# Patient Record
Sex: FEMALE | Race: WHITE | NOT HISPANIC OR LATINO | Employment: PART TIME | URBAN - METROPOLITAN AREA
[De-identification: names, ages, dates, MRNs, and addresses within clinical notes are randomized per-mention and may not be internally consistent; named-entity substitution may affect disease eponyms.]

---

## 2017-05-03 ENCOUNTER — PATIENT MESSAGE (OUTPATIENT)
Dept: GASTROENTEROLOGY | Facility: CLINIC | Age: 57
End: 2017-05-03

## 2017-05-03 ENCOUNTER — DOCUMENTATION ONLY (OUTPATIENT)
Dept: ENDOSCOPY | Facility: HOSPITAL | Age: 57
End: 2017-05-03

## 2017-05-03 DIAGNOSIS — K21.9 GASTROESOPHAGEAL REFLUX DISEASE, ESOPHAGITIS PRESENCE NOT SPECIFIED: Primary | ICD-10-CM

## 2017-05-03 NOTE — PROGRESS NOTES
From: Misty Armendariz     Sent: 5/3/2017 12:00 PM CDT       To: Solomon Whitaker MD  Subject: Non-Urgent Medical    Hi Dr. Whitaker,    I have been off my ppi for awhile and but feeling some heartburn and hoarseness.  I would like to schedule an upper GI with the ph probe to see if I have esophagus damage or can make a new plan to not include ppi's.  Please let me know what I need to do to schedule this procedure.  Thank you.

## 2017-05-30 ENCOUNTER — TELEPHONE (OUTPATIENT)
Dept: ENDOSCOPY | Facility: HOSPITAL | Age: 57
End: 2017-05-30

## 2017-05-30 NOTE — TELEPHONE ENCOUNTER
As per Mango marquez for Pt to remain on schedule for 6/14/17 for EGD/ROSE with Dr. Whitaker at 1400.

## 2017-06-14 ENCOUNTER — ANESTHESIA (OUTPATIENT)
Dept: ENDOSCOPY | Facility: HOSPITAL | Age: 57
End: 2017-06-14
Payer: COMMERCIAL

## 2017-06-14 ENCOUNTER — ANESTHESIA EVENT (OUTPATIENT)
Dept: ENDOSCOPY | Facility: HOSPITAL | Age: 57
End: 2017-06-14
Payer: COMMERCIAL

## 2017-06-14 ENCOUNTER — SURGERY (OUTPATIENT)
Age: 57
End: 2017-06-14

## 2017-06-14 ENCOUNTER — TELEPHONE (OUTPATIENT)
Dept: ENDOSCOPY | Facility: HOSPITAL | Age: 57
End: 2017-06-14

## 2017-06-14 ENCOUNTER — HOSPITAL ENCOUNTER (OUTPATIENT)
Facility: HOSPITAL | Age: 57
Discharge: HOME | End: 2017-06-14
Attending: INTERNAL MEDICINE | Admitting: INTERNAL MEDICINE
Payer: COMMERCIAL

## 2017-06-14 VITALS
RESPIRATION RATE: 14 BRPM | TEMPERATURE: 98 F | BODY MASS INDEX: 21 KG/M2 | WEIGHT: 123 LBS | SYSTOLIC BLOOD PRESSURE: 130 MMHG | OXYGEN SATURATION: 100 % | DIASTOLIC BLOOD PRESSURE: 69 MMHG | HEIGHT: 64 IN | HEART RATE: 71 BPM

## 2017-06-14 VITALS — RESPIRATION RATE: 21 BRPM

## 2017-06-14 DIAGNOSIS — K21.9 GASTROESOPHAGEAL REFLUX DISEASE WITHOUT ESOPHAGITIS: Primary | ICD-10-CM

## 2017-06-14 DIAGNOSIS — K22.10 EROSIVE ESOPHAGITIS: Primary | ICD-10-CM

## 2017-06-14 DIAGNOSIS — Z79.899 ENCOUNTER FOR LONG-TERM CURRENT USE OF MEDICATION: ICD-10-CM

## 2017-06-14 DIAGNOSIS — Z79.899 USE OF PROTON PUMP INHIBITOR THERAPY: ICD-10-CM

## 2017-06-14 DIAGNOSIS — R10.11 RUQ ABDOMINAL PAIN: ICD-10-CM

## 2017-06-14 DIAGNOSIS — K21.9 GERD (GASTROESOPHAGEAL REFLUX DISEASE): ICD-10-CM

## 2017-06-14 PROCEDURE — 25000003 PHARM REV CODE 250: Performed by: INTERNAL MEDICINE

## 2017-06-14 PROCEDURE — 43239 EGD BIOPSY SINGLE/MULTIPLE: CPT | Performed by: INTERNAL MEDICINE

## 2017-06-14 PROCEDURE — 37000008 HC ANESTHESIA 1ST 15 MINUTES: Performed by: INTERNAL MEDICINE

## 2017-06-14 PROCEDURE — 27201012 HC FORCEPS, HOT/COLD, DISP: Performed by: INTERNAL MEDICINE

## 2017-06-14 PROCEDURE — 63600175 PHARM REV CODE 636 W HCPCS: Performed by: NURSE ANESTHETIST, CERTIFIED REGISTERED

## 2017-06-14 PROCEDURE — 88305 TISSUE EXAM BY PATHOLOGIST: CPT | Mod: 26,,,

## 2017-06-14 PROCEDURE — D9220A PRA ANESTHESIA: Mod: ANES,,, | Performed by: ANESTHESIOLOGY

## 2017-06-14 PROCEDURE — 37000009 HC ANESTHESIA EA ADD 15 MINS: Performed by: INTERNAL MEDICINE

## 2017-06-14 PROCEDURE — 25000003 PHARM REV CODE 250: Performed by: NURSE ANESTHETIST, CERTIFIED REGISTERED

## 2017-06-14 PROCEDURE — 88305 TISSUE EXAM BY PATHOLOGIST: CPT

## 2017-06-14 PROCEDURE — 88342 IMHCHEM/IMCYTCHM 1ST ANTB: CPT | Mod: 26,,,

## 2017-06-14 PROCEDURE — 91035 G-ESOPH REFLX TST W/ELECTROD: CPT | Mod: 26,,, | Performed by: INTERNAL MEDICINE

## 2017-06-14 PROCEDURE — 91035 G-ESOPH REFLX TST W/ELECTROD: CPT | Performed by: INTERNAL MEDICINE

## 2017-06-14 PROCEDURE — 27200942: Performed by: INTERNAL MEDICINE

## 2017-06-14 PROCEDURE — 43239 EGD BIOPSY SINGLE/MULTIPLE: CPT | Mod: ,,, | Performed by: INTERNAL MEDICINE

## 2017-06-14 PROCEDURE — D9220A PRA ANESTHESIA: Mod: CRNA,,, | Performed by: NURSE ANESTHETIST, CERTIFIED REGISTERED

## 2017-06-14 RX ORDER — LIDOCAINE HCL/PF 100 MG/5ML
SYRINGE (ML) INTRAVENOUS
Status: DISCONTINUED | OUTPATIENT
Start: 2017-06-14 | End: 2017-06-14

## 2017-06-14 RX ORDER — GLYCOPYRROLATE 0.2 MG/ML
INJECTION INTRAMUSCULAR; INTRAVENOUS
Status: DISCONTINUED | OUTPATIENT
Start: 2017-06-14 | End: 2017-06-14

## 2017-06-14 RX ORDER — PROPOFOL 10 MG/ML
VIAL (ML) INTRAVENOUS
Status: DISCONTINUED | OUTPATIENT
Start: 2017-06-14 | End: 2017-06-14

## 2017-06-14 RX ORDER — SODIUM CHLORIDE 9 MG/ML
INJECTION, SOLUTION INTRAVENOUS CONTINUOUS
Status: DISCONTINUED | OUTPATIENT
Start: 2017-06-14 | End: 2017-06-14 | Stop reason: HOSPADM

## 2017-06-14 RX ADMIN — PROPOFOL 30 MG: 10 INJECTION, EMULSION INTRAVENOUS at 03:06

## 2017-06-14 RX ADMIN — SODIUM CHLORIDE: 0.9 INJECTION, SOLUTION INTRAVENOUS at 01:06

## 2017-06-14 RX ADMIN — PROPOFOL 40 MG: 10 INJECTION, EMULSION INTRAVENOUS at 03:06

## 2017-06-14 RX ADMIN — PROPOFOL 90 MG: 10 INJECTION, EMULSION INTRAVENOUS at 03:06

## 2017-06-14 RX ADMIN — LIDOCAINE HYDROCHLORIDE 100 MG: 20 INJECTION, SOLUTION INTRAVENOUS at 03:06

## 2017-06-14 RX ADMIN — GLYCOPYRROLATE 0.2 MG: 0.2 INJECTION, SOLUTION INTRAMUSCULAR; INTRAVENOUS at 03:06

## 2017-06-14 NOTE — H&P
[facility]  History & Physical    Subjective:      Chief Complaint/Reason for Admission:     EGD with Trevino    Misty Armendariz is a 57 y.o. female.    Past Medical History:   Diagnosis Date    Cancer     Cataract     surgery left eye, blepperplexy bilat eye    Skin cancer      Past Surgical History:   Procedure Laterality Date     SECTION      WISDOM TOOTH EXTRACTION       Family History   Problem Relation Age of Onset    Celiac disease Neg Hx     Cirrhosis Neg Hx     Colon cancer Neg Hx     Colon polyps Neg Hx     Crohn's disease Neg Hx     Cystic fibrosis Neg Hx     Esophageal cancer Neg Hx     Hemochromatosis Neg Hx     Inflammatory bowel disease Neg Hx     Irritable bowel syndrome Neg Hx     Liver cancer Neg Hx     Liver disease Neg Hx     Rectal cancer Neg Hx     Stomach cancer Neg Hx     Ulcerative colitis Neg Hx      Social History   Substance Use Topics    Smoking status: Never Smoker    Smokeless tobacco: Not on file    Alcohol use 4.2 oz/week     4 Glasses of wine, 3 Shots of liquor per week       PTA Medications   Medication Sig    omeprazole (PRILOSEC) 40 MG capsule Take 1 capsule (40 mg total) by mouth before breakfast.     Review of patient's allergies indicates:   Allergen Reactions    Erythromycin     Penicillins         Review of Systems   Constitutional: Negative for chills, fever and weight loss.   Respiratory: Negative for shortness of breath and wheezing.    Cardiovascular: Negative for chest pain.   Gastrointestinal: Positive for heartburn. Negative for abdominal pain, blood in stool, constipation, diarrhea and melena.       Objective:      Vital Signs (Most Recent)  Temp: 99.1 °F (37.3 °C) (17 1332)  Pulse: 64 (17 1332)  Resp: 18 (17 1332)  BP: 126/67 (17 1332)  SpO2: 98 % (17 1332)    Vital Signs Range (Last 24H):  Temp:  [99.1 °F (37.3 °C)]   Pulse:  [64]   Resp:  [18-30]   BP: (126)/(67)   SpO2:  [98 %]      Physical Exam   Constitutional: She is oriented to person, place, and time. She appears well-developed and well-nourished.   Cardiovascular: Normal rate.    Pulmonary/Chest: Effort normal.   Abdominal: Soft. Bowel sounds are normal.   Neurological: She is alert and oriented to person, place, and time.   Skin: Skin is warm and dry.   Psychiatric: She has a normal mood and affect. Her behavior is normal. Judgment and thought content normal.           Assessment:      Active Hospital Problems    Diagnosis  POA    GERD (gastroesophageal reflux disease) [K21.9]  Yes      Resolved Hospital Problems    Diagnosis Date Resolved POA   No resolved problems to display.       Plan:    EGD with esophageal Bravo pH probe placement off PPI and off H2 Blockers for 6 weeks.

## 2017-06-14 NOTE — TRANSFER OF CARE
"Anesthesia Transfer of Care Note    Patient: Misty Armendariz    Procedure(s) Performed: Procedure(s) (LRB):  ESOPHAGOGASTRODUODENOSCOPY (EGD) (N/A)  ESOPHAGEAL BRAVO PH (N/A)    Patient location: PACU    Anesthesia Type: general    Transport from OR: Transported from OR on room air with adequate spontaneous ventilation    Post pain: adequate analgesia    Post assessment: no apparent anesthetic complications and tolerated procedure well    Post vital signs: stable    Level of consciousness: sedated    Nausea/Vomiting: no nausea/vomiting    Complications: none    Transfer of care protocol was followed      Last vitals:   Visit Vitals  /67   Pulse 64   Temp 37.3 °C (99.1 °F) (Oral)   Resp 18   Ht 5' 4" (1.626 m)   Wt 55.8 kg (123 lb)   SpO2 98%   Breastfeeding? No   BMI 21.11 kg/m²     "

## 2017-06-14 NOTE — ANESTHESIA PREPROCEDURE EVALUATION
06/14/2017  Misty Armendariz is a 57 y.o., female.    Anesthesia Evaluation         Review of Systems      Physical Exam  General:  Well nourished    Airway/Jaw/Neck:  Airway Findings: Mouth Opening: Normal Tongue: Normal  General Airway Assessment: Adult  Mallampati: II  Improves to II with phonation.  TM Distance: Normal, at least 6 cm      Dental:  Dental Findings: In tact   Chest/Lungs:  Chest/Lungs Findings: Clear to auscultation     Heart/Vascular:  Heart Findings: Rate: Normal  Rhythm: Regular Rhythm  Sounds: Normal        Mental Status:  Mental Status Findings:  Cooperative, Alert and Oriented         Anesthesia Plan  Type of Anesthesia, risks & benefits discussed:  Anesthesia Type:  general  Patient's Preference: General  Intra-op Monitoring Plan: standard ASA monitors  Intra-op Monitoring Plan Comments: Standard ASA monitors.   Post Op Pain Control Plan: per primary service following discharge from PACU  Post Op Pain Control Plan Comments: Per primary service.     Induction:   IV  Beta Blocker:  Patient is not currently on a Beta-Blocker (No further documentation required).       Informed Consent: Patient understands risks and agrees with Anesthesia plan.  Questions answered. Anesthesia consent signed with patient.  ASA Score: 2     Day of Surgery Review of History & Physical:    H&P update referred to the surgeon.     Anesthesia Plan Notes: Chart reviewed, patient interviewed and examined.  The plan for general anesthesia was explained.  Questions were answered and the consent was signed.  Cesar RAO         Ready For Surgery From Anesthesia Perspective.

## 2017-06-14 NOTE — PATIENT INSTRUCTIONS
Discharge Summary/Instructions after an Endoscopic Procedure  Patient Name: Misty Armendariz  Patient MRN: [id number]  Patient YOB: 1960 Wednesday, June 14, 2017  Solomon Whitaker MD  RESTRICTIONS ON ACTIVITY:  - DO NOT drive a car, operate machinery, make legal/financial decisions, or   drink alcohol until the day after the procedure.    - The following day: return to full activity including work, except no heavy   lifting, straining or running for 3 days if polyps were removed.  - Diet: Eat and drink normally unless instructed otherwise.  TREATMENT FOR COMMON SIDE EFFECTS:  - Mild abdominal pain, bloating or excessive gas: rest, eat lightly and use   a heating pad.  - Sore Throat - treat with throat lozenges. Gargle with warm salt water.  SYMPTOMS TO WATCH FOR AND REPORT TO YOUR PHYSICIAN:  1. Severe abdominal pain or bloating.  2. Pain in chest.  3. Chills or fever occurring within 24 hours after a procedure.  4. A large amount of rectal bleeding, which would show as bright red,   maroon, or black stools. (A small amount of blood from the rectum is not   serious, especially if hemorrhoids are present.)  5. Because air was used during the procedure, expelling large amounts of air   from your rectum or belching is normal.  6. If a bowel prep was taken, you may not have a bowel movement for 1-3   days.  This is normal.  7. Go directly to the emergency room if you notice any of the following:   Chills and/or fever over 101 F   Persistent vomiting   Severe abdominal pain, other than gas cramps   Severe chest pain   Black, tarry stools   Any bleeding - exceeding one tablespoon  Your doctor recommends these additional instructions:  If any biopsies were performed, my office will call you in 5 to 6 business   days with any results.  You are being discharged to home.   We are waiting for your pathology results.   Telephone your endoscopist for pathology results in two weeks.   Telephone your endoscopist for study  results in one week.   Return to your GI clinic at the next available appointment.   Your physician has recommended a repeat upper endoscopy in 12 weeks to check   healing.  For questions, problems or results please call your physician - Solomon Whitaker MD at Work:  [phone].  [facility], EMERGENCY ROOM PHONE NUMBER: [phone]  IF A COMPLICATION OR EMERGENCY SITUATION ARISES AND YOU ARE UNABLE TO REACH   YOUR PHYSICIAN - GO TO THE EMERGENCY ROOM.  Solomon Whitaker MD  6/14/2017 3:28:38 PM  This report has been verified and signed electronically.

## 2017-06-15 ENCOUNTER — PATIENT MESSAGE (OUTPATIENT)
Dept: GASTROENTEROLOGY | Facility: CLINIC | Age: 57
End: 2017-06-15

## 2017-06-15 ENCOUNTER — TELEPHONE (OUTPATIENT)
Dept: GASTROENTEROLOGY | Facility: CLINIC | Age: 57
End: 2017-06-15

## 2017-06-15 NOTE — TELEPHONE ENCOUNTER
Patient aware to be here no later than 4:00 tomorrow.  Aware the machine will shut off at 3:18. Patient expressed understanding.

## 2017-06-15 NOTE — TELEPHONE ENCOUNTER
----- Message from Gracie Mondragon sent at 6/15/2017  3:02 PM CDT -----  Contact: self - [phone]  lizett - when does she need to return the ph monitor - please call patient at

## 2017-06-15 NOTE — ANESTHESIA POSTPROCEDURE EVALUATION
"Anesthesia Post Evaluation    Patient: Misty Armendariz    Procedure(s) Performed: Procedure(s) (LRB):  ESOPHAGOGASTRODUODENOSCOPY (EGD) (N/A)  ESOPHAGEAL BRAVO PH (N/A)    Final Anesthesia Type: general  Patient location during evaluation: PACU  Patient participation: Yes- Able to Participate  Level of consciousness: awake and alert and oriented  Post-procedure vital signs: reviewed and stable  Pain management: adequate  Airway patency: patent  PONV status at discharge: No PONV  Anesthetic complications: no      Cardiovascular status: stable  Respiratory status: unassisted  Hydration status: euvolemic  Follow-up not needed.        Visit Vitals  /69 (BP Location: Left arm, Patient Position: Sitting, BP Method: Automatic)   Pulse 71   Temp 36.6 °C (97.8 °F) (Oral)   Resp 14   Ht 5' 4" (1.626 m)   Wt 55.8 kg (123 lb)   SpO2 100%   Breastfeeding? No   BMI 21.11 kg/m²       Pain/Carrie Score: Pain Assessment Performed: Yes (6/14/2017  3:55 PM)  Presence of Pain: denies (6/14/2017  3:55 PM)  Pain Rating Prior to Med Admin: 2 (6/14/2017  1:29 PM)  Carrie Score: 10 (6/14/2017  3:55 PM)      "

## 2017-06-21 ENCOUNTER — TELEPHONE (OUTPATIENT)
Dept: ENDOSCOPY | Facility: HOSPITAL | Age: 57
End: 2017-06-21

## 2017-06-22 ENCOUNTER — TELEPHONE (OUTPATIENT)
Dept: GASTROENTEROLOGY | Facility: CLINIC | Age: 57
End: 2017-06-22

## 2017-06-22 NOTE — TELEPHONE ENCOUNTER
----- Message from Solomon Whitaker MD sent at 6/21/2017  5:49 PM CDT -----  Maria Ines - please tell Misty that her EGd pathology was benign and no dysplasia.    SPECIMEN  1) Random stomach. Rule out H. pylori and one small gastric polyp.    FINAL PATHOLOGIC DIAGNOSIS    MILD CHRONIC GASTRITIS.  IMMUNOSTAIN FOR H. PYLORI IS NEGATIVE, WITH PROPER POSITIVE AND NEGATIVE CONTROLS.  ONE FRAGMENT WITH FOCAL GLANDULAR DILATION, SUGGESTIVE OF BENIGN FUNDIC GLAND POLYP.  NO INTESTINAL METAPLASIA OR DYSPLASIA IDENTIFIED.    Diagnosed by: Adolph Olvera M.D.

## 2017-10-25 ENCOUNTER — PATIENT MESSAGE (OUTPATIENT)
Dept: ENDOSCOPY | Facility: HOSPITAL | Age: 57
End: 2017-10-25

## 2017-10-25 ENCOUNTER — ANESTHESIA (OUTPATIENT)
Dept: ENDOSCOPY | Facility: HOSPITAL | Age: 57
End: 2017-10-25
Payer: COMMERCIAL

## 2017-10-25 ENCOUNTER — ANESTHESIA EVENT (OUTPATIENT)
Dept: ENDOSCOPY | Facility: HOSPITAL | Age: 57
End: 2017-10-25
Payer: COMMERCIAL

## 2017-10-25 ENCOUNTER — HOSPITAL ENCOUNTER (OUTPATIENT)
Facility: HOSPITAL | Age: 57
Discharge: HOME | End: 2017-10-25
Attending: INTERNAL MEDICINE | Admitting: INTERNAL MEDICINE
Payer: COMMERCIAL

## 2017-10-25 VITALS
RESPIRATION RATE: 15 BRPM | DIASTOLIC BLOOD PRESSURE: 89 MMHG | TEMPERATURE: 98 F | HEART RATE: 78 BPM | SYSTOLIC BLOOD PRESSURE: 122 MMHG | OXYGEN SATURATION: 98 % | RESPIRATION RATE: 44 BRPM

## 2017-10-25 DIAGNOSIS — K22.10 ESOPHAGITIS, EROSIVE: Primary | ICD-10-CM

## 2017-10-25 DIAGNOSIS — K22.10 EROSIVE ESOPHAGITIS: ICD-10-CM

## 2017-10-25 PROCEDURE — D9220A PRA ANESTHESIA: Mod: CRNA,,, | Performed by: NURSE ANESTHETIST, CERTIFIED REGISTERED

## 2017-10-25 PROCEDURE — 37000008 HC ANESTHESIA 1ST 15 MINUTES: Performed by: INTERNAL MEDICINE

## 2017-10-25 PROCEDURE — 88305 TISSUE EXAM BY PATHOLOGIST: CPT | Performed by: PATHOLOGY

## 2017-10-25 PROCEDURE — 27201012 HC FORCEPS, HOT/COLD, DISP: Performed by: INTERNAL MEDICINE

## 2017-10-25 PROCEDURE — 25000003 PHARM REV CODE 250: Performed by: NURSE ANESTHETIST, CERTIFIED REGISTERED

## 2017-10-25 PROCEDURE — 43239 EGD BIOPSY SINGLE/MULTIPLE: CPT | Performed by: INTERNAL MEDICINE

## 2017-10-25 PROCEDURE — 88305 TISSUE EXAM BY PATHOLOGIST: CPT | Mod: 26,,, | Performed by: PATHOLOGY

## 2017-10-25 PROCEDURE — 43239 EGD BIOPSY SINGLE/MULTIPLE: CPT | Mod: ,,, | Performed by: INTERNAL MEDICINE

## 2017-10-25 PROCEDURE — D9220A PRA ANESTHESIA: Mod: ANES,,, | Performed by: ANESTHESIOLOGY

## 2017-10-25 PROCEDURE — 25000003 PHARM REV CODE 250: Performed by: INTERNAL MEDICINE

## 2017-10-25 PROCEDURE — 37000009 HC ANESTHESIA EA ADD 15 MINS: Performed by: INTERNAL MEDICINE

## 2017-10-25 PROCEDURE — 63600175 PHARM REV CODE 636 W HCPCS: Performed by: NURSE ANESTHETIST, CERTIFIED REGISTERED

## 2017-10-25 RX ORDER — LIDOCAINE HYDROCHLORIDE 10 MG/ML
INJECTION, SOLUTION INTRAVENOUS
Status: DISCONTINUED | OUTPATIENT
Start: 2017-10-25 | End: 2017-10-25

## 2017-10-25 RX ORDER — SODIUM CHLORIDE 9 MG/ML
INJECTION, SOLUTION INTRAVENOUS CONTINUOUS PRN
Status: DISCONTINUED | OUTPATIENT
Start: 2017-10-25 | End: 2017-10-25

## 2017-10-25 RX ORDER — PROPOFOL 10 MG/ML
VIAL (ML) INTRAVENOUS
Status: DISCONTINUED | OUTPATIENT
Start: 2017-10-25 | End: 2017-10-25

## 2017-10-25 RX ORDER — PANTOPRAZOLE SODIUM 20 MG/1
20 TABLET, DELAYED RELEASE ORAL
Qty: 90 TABLET | Refills: 3 | Status: SHIPPED | OUTPATIENT
Start: 2017-10-25 | End: 2018-11-15 | Stop reason: SDUPTHER

## 2017-10-25 RX ORDER — SODIUM CHLORIDE 9 MG/ML
INJECTION, SOLUTION INTRAVENOUS CONTINUOUS
Status: DISCONTINUED | OUTPATIENT
Start: 2017-10-25 | End: 2017-10-25 | Stop reason: HOSPADM

## 2017-10-25 RX ADMIN — SODIUM CHLORIDE: 0.9 INJECTION, SOLUTION INTRAVENOUS at 10:10

## 2017-10-25 RX ADMIN — LIDOCAINE HYDROCHLORIDE 100 MG: 10 INJECTION, SOLUTION INTRAVENOUS at 10:10

## 2017-10-25 RX ADMIN — SODIUM CHLORIDE: 900 INJECTION, SOLUTION INTRAVENOUS at 09:10

## 2017-10-25 RX ADMIN — PROPOFOL 60 MG: 10 INJECTION, EMULSION INTRAVENOUS at 10:10

## 2017-10-25 NOTE — PATIENT INSTRUCTIONS
Discharge Summary/Instructions after an Endoscopic Procedure  Patient Name: Misty Armendariz  Patient MRN: [id number]  Patient YOB: 1960 Wednesday, October 25, 2017  Solomon Whitaker MD  RESTRICTIONS:  During your procedure today, you received medications for sedation.  These   medications may affect your judgment, balance and coordination.  Therefore,   for 24 hours, you have the following restrictions:   - DO NOT drive a car, operate machinery, make legal/financial decisions,   sign important papers or drink alcohol.    ACTIVITY:  The following day: return to full activity including work, except no heavy   lifting, straining or running for 3 days if polyps were removed.  DIET:  Eat and drink normally unless instructed otherwise.     TREATMENT FOR COMMON SIDE EFFECTS:  - Mild abdominal pain, belching, bloating or excessive gas: rest, eat   lightly and use a heating pad.  - Sore Throat: treat with throat lozenges and/or gargle with warm salt   water.  SYMPTOMS TO WATCH FOR AND REPORT TO YOUR PHYSICIAN:  1. Abdominal pain or bloating, other than gas cramps.  2. Chest pain.  3. Back pain.  4. Chills or fever occurring within 24 hours after the procedure.  5. Rectal bleeding, which would show as bright red, maroon, or black stools.   (A tablespoon of blood from the rectum is not serious, especially if   hemorrhoids are present.)  6. Vomiting.  7. Weakness or dizziness.  8. Because air was used during the procedure, expelling large amounts of air   from your rectum or belching is normal.  9. If a bowel prep was taken, you may not have a bowel movement for 1-3   days.  This is normal.  GO DIRECTLY TO THE NEAREST EMERGENCY ROOM IF YOU HAVE ANY OF THE FOLLOWING:      Difficulty breathing  Chills and/or fever over 101 F   Persistent vomiting and/or vomiting blood   Severe abdominal pain   Severe chest pain   Black, tarry stools   Bleeding- more than one tablespoon  Your doctor recommends these additional  instructions:  If any biopsies were taken, your doctors clinic will contact you in 1 to 2   weeks with any results.  You are being discharged to home.   Follow an antireflux regimen indefinitely.  This includes:       - Do not lie down for at least 3 to 4 hours after meals.        - Raise the head of the bed 4 to 6 inches.        - Decrease excess weight.        - Avoid citrus juices and other acidic foods, alcohol, chocolate, mints,   coffee and other caffeinated beverages, carbonated beverages, fatty and   fried foods.        - Avoid tight-fitting clothing.        - Avoid cigarettes and other tobacco products.   We are waiting for your pathology results.   Telephone your endoscopist for pathology results in two weeks.   Take Protonix (pantoprazole) 20 mg by mouth once a day.   Your physician has recommended a repeat upper endoscopy in 12 weeks to check   healing.   Return to your GI clinic at the next available appointment.  For questions, problems or results please call your physician - Solomon Whitaker MD at Work:  [phone].  [facility], EMERGENCY ROOM PHONE NUMBER: [phone]  IF A COMPLICATION OR EMERGENCY SITUATION ARISES AND YOU ARE UNABLE TO REACH   YOUR PHYSICIAN - GO DIRECTLY TO THE EMERGENCY ROOM.  Solomon Whitaker MD  10/25/2017 11:07:31 AM  This report has been verified and signed electronically.

## 2017-10-25 NOTE — TRANSFER OF CARE
Anesthesia Transfer of Care Note    Patient: Misty Armendariz    Procedure(s) Performed: Procedure(s) (LRB):  ESOPHAGOGASTRODUODENOSCOPY (EGD) (N/A)    Patient location: PACU    Anesthesia Type: general    Transport from OR: Transported from OR on room air with adequate spontaneous ventilation. Transported from OR on 6-10 L/min O2 by face mask with adequate spontaneous ventilation    Post pain: adequate analgesia    Post assessment: no apparent anesthetic complications and tolerated procedure well    Post vital signs: stable    Level of consciousness: awake and alert    Nausea/Vomiting: no nausea/vomiting    Complications: none    Transfer of care protocol was followed      Last vitals:   Visit Vitals  BP (!) 119/59   Pulse 67   Temp 36.7 °C (98 °F)   Resp 15   SpO2 (!) 94%   Breastfeeding? No

## 2017-10-25 NOTE — ANESTHESIA PREPROCEDURE EVALUATION
10/25/2017  Misty Armendariz is a 57 y.o., female.  Patient Active Problem List   Diagnosis    Gastroesophageal reflux disease without esophagitis    Encounter for long-term current use of medication    Use of proton pump inhibitor therapy    RUQ abdominal pain    GERD (gastroesophageal reflux disease)    Erosive esophagitis       Anesthesia Evaluation    I have reviewed the Patient Summary Reports.    I have reviewed the Nursing Notes.   I have reviewed the Medications.     Review of Systems      Physical Exam  General:  Well nourished    Airway/Jaw/Neck:  Airway Findings: Mouth Opening: Normal General Airway Assessment: Adult  Mallampati: II  Improves to II with phonation.  Jaw/Neck Findings:  Limited Ability to Prognath  Neck ROM: Normal ROM     Eyes/Ears/Nose:  Eyes/Ears/Nose Findings:    Dental:  Dental Findings: In tact   Chest/Lungs:  Chest/Lungs Findings: Clear to auscultation, Normal Respiratory Rate     Heart/Vascular:  Heart Findings: Rate: Normal  Rhythm: Regular Rhythm  Sounds: Normal     Abdomen:  Abdomen Findings:  Normal     Musculoskeletal:  Musculoskeletal Findings:    Skin:  Skin Findings:     Mental Status:  Mental Status Findings:  Cooperative, Alert and Oriented         Anesthesia Plan  Type of Anesthesia, risks & benefits discussed:  Anesthesia Type:  general, MAC  Patient's Preference:   Intra-op Monitoring Plan:   Intra-op Monitoring Plan Comments:   Post Op Pain Control Plan:   Post Op Pain Control Plan Comments:   Induction:   IV  Beta Blocker:  Patient is not currently on a Beta-Blocker (No further documentation required).       Informed Consent: Patient understands risks and agrees with Anesthesia plan.  Questions answered. Anesthesia consent signed with patient.  ASA Score: 2     Day of Surgery Review of History & Physical:    H&P update referred to the surgeon.         Ready  For Surgery From Anesthesia Perspective.

## 2017-10-25 NOTE — H&P
[facility]  History & Physical    Subjective:      Chief Complaint/Reason for Admission:     EGD    Misty Armendariz is a 57 y.o. female.    Past Medical History:   Diagnosis Date    Cancer     Cataract     surgery left eye, blepperplexy bilat eye    Skin cancer      Past Surgical History:   Procedure Laterality Date     SECTION      WISDOM TOOTH EXTRACTION       Family History   Problem Relation Age of Onset    Celiac disease Neg Hx     Cirrhosis Neg Hx     Colon cancer Neg Hx     Colon polyps Neg Hx     Crohn's disease Neg Hx     Cystic fibrosis Neg Hx     Esophageal cancer Neg Hx     Hemochromatosis Neg Hx     Inflammatory bowel disease Neg Hx     Irritable bowel syndrome Neg Hx     Liver cancer Neg Hx     Liver disease Neg Hx     Rectal cancer Neg Hx     Stomach cancer Neg Hx     Ulcerative colitis Neg Hx      Social History   Substance Use Topics    Smoking status: Never Smoker    Smokeless tobacco: Not on file    Alcohol use 4.2 oz/week     4 Glasses of wine, 3 Shots of liquor per week      Comment: weekly        No prescriptions prior to admission.     Review of patient's allergies indicates:   Allergen Reactions    Erythromycin     Penicillins         Review of Systems   Constitutional: Negative for chills, fever and weight loss.   Respiratory: Negative for shortness of breath and wheezing.    Cardiovascular: Negative for chest pain.   Gastrointestinal: Negative for abdominal pain, blood in stool, constipation, diarrhea and melena.       Objective:      Vital Signs (Most Recent)  Temp: 97.7 °F (36.5 °C) (10/25/17 0905)  Pulse: 64 (10/25/17 0905)  Resp: 15 (10/25/17 0905)  BP: 127/80 (10/25/17 0905)  SpO2: 99 % (10/25/17 0905)    Vital Signs Range (Last 24H):  Temp:  [97.7 °F (36.5 °C)]   Pulse:  [64]   Resp:  [15-86]   BP: (127)/(80)   SpO2:  [99 %]     Physical Exam   Constitutional: She appears well-developed and well-nourished.   Cardiovascular: Normal  rate.    Pulmonary/Chest: Effort normal.   Abdominal: Soft. Bowel sounds are normal.   Skin: Skin is warm and dry.   Psychiatric: She has a normal mood and affect. Her behavior is normal. Judgment and thought content normal.           Assessment:      Active Hospital Problems    Diagnosis  POA    Erosive esophagitis [K22.10]  Yes      Resolved Hospital Problems    Diagnosis Date Resolved POA   No resolved problems to display.       Plan:    EGD for follow up erosive esophagitis

## 2017-10-27 ENCOUNTER — PATIENT MESSAGE (OUTPATIENT)
Dept: GASTROENTEROLOGY | Facility: CLINIC | Age: 57
End: 2017-10-27

## 2017-10-27 NOTE — ANESTHESIA POSTPROCEDURE EVALUATION
Anesthesia Post Evaluation    Patient: Misty Armendariz    Procedure(s) Performed: Procedure(s) (LRB):  ESOPHAGOGASTRODUODENOSCOPY (EGD) (N/A)    Final Anesthesia Type: general  Patient location during evaluation: PACU  Patient participation: Yes- Able to Participate  Level of consciousness: awake and alert and oriented  Pain management: adequate  Airway patency: patent  PONV status at discharge: No PONV  Anesthetic complications: no      Cardiovascular status: blood pressure returned to baseline and hemodynamically stable  Respiratory status: unassisted  Hydration status: euvolemic  Follow-up not needed.        Visit Vitals  /89   Pulse 78   Temp 36.7 °C (98 °F)   Resp 15   SpO2 98%   Breastfeeding? No       Pain/Carrie Score: No Data Recorded

## 2017-10-27 NOTE — ANESTHESIA POSTPROCEDURE EVALUATION
Anesthesia Post Evaluation    Patient: Misty Armendariz    Procedure(s) Performed: Procedure(s) (LRB):  ESOPHAGOGASTRODUODENOSCOPY (EGD) (N/A)    [facility] Anesthesia Post Op Evaluation    Visit Vitals  /89   Pulse 78   Temp 36.7 °C (98 °F)   Resp 15   SpO2 98%   Breastfeeding? No       Pain/Carrie Score: No Data Recorded

## 2017-11-01 ENCOUNTER — TELEPHONE (OUTPATIENT)
Dept: ENDOSCOPY | Facility: HOSPITAL | Age: 57
End: 2017-11-01

## 2018-07-14 NOTE — DISCHARGE INSTRUCTIONS
Capsule Endoscopy     The capsule is a tiny camera that takes pictures as it moves through the digestive tract.   Capsule endoscopy is a test done to take pictures of the digestive tract. It uses a capsule with a tiny camera in it. The capsule is swallowed like a pill. As the capsule travels through the digestive tract, it takes pictures. These pictures are sent to a recorder that is worn outside the body. The capsule passes out of the body through the stool in a few days. Capsule endoscopy is most often done to check for problems in the small bowel (intestine) that are hard to see with a standard endoscopy or colonoscopy. These problems include bleeding and tumors. The test can also help diagnose Crohns disease. This is a condition that causes inflammation, sores, and narrowing of the bowel.  Before the test  Tell your provider about any medicines you are taking. You may need to stop taking all or some of these before the test. This includes:  · All prescription medicines  · Over-the-counter medicines such as aspirin or ibuprofen  · Street drugs  · Herbs, vitamins, and other supplements  Also before the test:  · Tell your provider about any surgeries youve had before, that could cause the capsule to get stuck.  · Switch to a clear liquid diet 16 hours before the test.  · Dont eat anything starting from 12 hours before the test.  · You may be instructed to do a bowel cleansing or use a laxative. This may be needed to help clear your bowels before the test.  · Follow any other instructions from your provider.  During the test  The test is done in a providers office or hospital:  · Youll be asked to raise your shirt.  · Small, sticky, round patches are placed on the skin over your belly (abdomen). The patches have antennas that are attached to short wires (leads).  · The wires are then plugged into a data recorder. The recorder is attached to a belt worn around your waist.  · Once the recorder is confirmed to  be working, youll be given the capsule to swallow. (In rare cases, the pill may be placed in your small bowel with the help of an endoscope. This is a thin, flexible tube that can be inserted through your mouth and down into the digestive tract. Your doctor will tell you more about this, if needed.) The capsule works by sending pictures to the recorder as it moves through your stomach and small bowel.  · After you swallow the capsule, youre usually allowed to leave the facility. You can drink clear liquids after 2 hours, and you can eat food or take medicines after 4 hours. Be sure to follow any other instructions from your provider. For instance, you may be told not to do certain activities if they can affect your test results.  · You will be instructed when to return to your providers office or the hospital. All of the equipment is then removed.  · To prevent complications, do not schedule an MRI exam or go near the MRI device until the capsule has passed out of your body.  · The capsule is passed out of your body through your stool. If this does not happen within 3 days, let your healthcare provider know right away. Treatment may have to be done to remove the capsule. Your provider will tell you more about this, if needed.  After the test  Once the pictures are reviewed, your provider will go over the results with you. This is usually within a few days. If the pictures were blurry or unclear, the test may need to be done again.  Risks and complications  There is a small chance that the capsule will not pass out of your body. In that case, the capsule is most likely stuck in your bowels. Surgery or endoscopy will be needed to remove the capsule.   Date Last Reviewed: 7/1/2016 © 2000-2016 [facility] [facility]. [address] [place] [id number]. All rights reserved. This information is not intended as a substitute for professional medical care. Always follow your healthcare professional's  instructions.         (3) walks occasionally

## 2018-11-15 DIAGNOSIS — K22.10 ESOPHAGITIS, EROSIVE: ICD-10-CM

## 2018-11-15 RX ORDER — PANTOPRAZOLE SODIUM 20 MG/1
20 TABLET, DELAYED RELEASE ORAL
Qty: 90 TABLET | Refills: 3 | Status: SHIPPED | OUTPATIENT
Start: 2018-11-15 | End: 2019-11-18 | Stop reason: SDUPTHER

## 2018-11-15 RX ORDER — PANTOPRAZOLE SODIUM 20 MG/1
TABLET, DELAYED RELEASE ORAL
Qty: 90 TABLET | Refills: 0 | Status: SHIPPED | OUTPATIENT
Start: 2018-11-15 | End: 2018-11-15 | Stop reason: SDUPTHER

## 2019-06-15 ENCOUNTER — TELEPHONE (OUTPATIENT)
Dept: ENDOSCOPY | Facility: HOSPITAL | Age: 59
End: 2019-06-15

## 2019-11-18 DIAGNOSIS — K22.10 ESOPHAGITIS, EROSIVE: ICD-10-CM

## 2019-11-18 RX ORDER — PANTOPRAZOLE SODIUM 20 MG/1
TABLET, DELAYED RELEASE ORAL
Qty: 90 TABLET | Refills: 0 | Status: SHIPPED | OUTPATIENT
Start: 2019-11-18 | End: 2020-02-21

## 2020-02-21 DIAGNOSIS — K22.10 ESOPHAGITIS, EROSIVE: ICD-10-CM

## 2020-02-21 RX ORDER — PANTOPRAZOLE SODIUM 20 MG/1
TABLET, DELAYED RELEASE ORAL
Qty: 90 TABLET | Refills: 0 | Status: SHIPPED | OUTPATIENT
Start: 2020-02-21 | End: 2020-05-22

## 2020-03-24 ENCOUNTER — PATIENT MESSAGE (OUTPATIENT)
Dept: GASTROENTEROLOGY | Facility: CLINIC | Age: 60
End: 2020-03-24

## 2020-03-25 ENCOUNTER — OFFICE VISIT (OUTPATIENT)
Dept: GASTROENTEROLOGY | Facility: CLINIC | Age: 60
End: 2020-03-25
Payer: COMMERCIAL

## 2020-03-25 ENCOUNTER — TELEPHONE (OUTPATIENT)
Dept: GASTROENTEROLOGY | Facility: CLINIC | Age: 60
End: 2020-03-25

## 2020-03-25 DIAGNOSIS — T36.4X5A ESOPHAGITIS DUE TO DRUG: ICD-10-CM

## 2020-03-25 DIAGNOSIS — K20.80 ESOPHAGITIS DUE TO DRUG: ICD-10-CM

## 2020-03-25 DIAGNOSIS — Z79.899 ENCOUNTER FOR MONITORING LONG-TERM PROTON PUMP INHIBITOR THERAPY: ICD-10-CM

## 2020-03-25 DIAGNOSIS — R13.10 ODYNOPHAGIA: Primary | ICD-10-CM

## 2020-03-25 DIAGNOSIS — K44.9 HIATAL HERNIA: ICD-10-CM

## 2020-03-25 DIAGNOSIS — Z51.81 ENCOUNTER FOR MONITORING LONG-TERM PROTON PUMP INHIBITOR THERAPY: ICD-10-CM

## 2020-03-25 PROCEDURE — 99202 OFFICE O/P NEW SF 15 MIN: CPT | Mod: 95,,, | Performed by: INTERNAL MEDICINE

## 2020-03-25 PROCEDURE — 99202 PR OFFICE/OUTPT VISIT, NEW, LEVL II, 15-29 MIN: ICD-10-PCS | Mod: 95,,, | Performed by: INTERNAL MEDICINE

## 2020-03-25 NOTE — PROGRESS NOTES
The patient location is:  patient's work  The chief complaint leading to consultation is:  Odynophagia  Visit type: Virtual visit with synchronous audio and video  Total time spent with patient:  25 min  Each patient to whom he or she provides medical services by telemedicine is:  (1) informed of the relationship between the physician and patient and the respective role of any other health care provider with respect to management of the patient; and (2) notified that he or she may decline to receive medical services by telemedicine and may withdraw from such care at any time.    Notes:  See below        [facility] Consultation Note    Reason for Consult:  The primary encounter diagnosis was Odynophagia. Diagnoses of Esophagitis due to drug, Hiatal hernia, and Encounter for monitoring long-term proton pump inhibitor therapy were also pertinent to this visit.    PCP:   Primary Doctor No       Referring MD:  No referring provider defined for this encounter.    Initial History of Present Illness (HPI):  This is a 59 y.o. female telephone visit for odynophagia.  Patient states that she is currently being treated for sinus infection she originally took doxycycline 100 mg every 12 hr for 7 days but symptoms persisted so they put on clindamycin every 8 hr for 14 days.  She is currently on day 8 of 14 days.  She said last Sunday night March 22nd she had for gotten to take her medication earlier it was 11:00 p.m. at night she was tired she did not have a lot of water she took her clindamycin antibiotic pill with very little water and laid down immediately within an hour hour and a half after falling asleep she woke up with severe discomfort in her esophagus and pain when she swallowed.  She still takes pantoprazole 20 mg once daily.  She says every time she swallows she has the discomfort in her esophagus mid to distal.  Every day since then it has gotten a little bit better it has this kind a sore.  No  rash no shingles.  No exertional chest pain no jaw pain no nausea or vomiting no dysphagia.  She does have odynophagia to both liquids and solids and sometimes with dry swallows.  Every day it is getting better.  She has had 2 EGDs in the past last in  spur [place] grade B esophagitis 1 cm sliding hiatal hernia.  She denies fever chills or weight loss no pleurisy no leg swelling no DVT or PE history in the past.  She does drink alcohol in moderation she says.  She has not drunk alcohol she did not do a follow-up EGD as recommended which would been 12 weeks after her last in .  She is due for a colonoscopy in December of this year for screening.  She is not serve for acid reflux is completely well controlled on pantoprazole 20 mg once daily she knows the best taken 45 min for 1st protein she does want to be on the lowest dose medication she is made a point of this.  She does do exertional exercise few times a week noted exertional chest pain or fatigue or shortness of breath well she exercises.  No GI bleeding.  No change in bowel habits blood in her stool no throwing up blood.    Abdominal pain - no  Reflux - yes  Dysphagia - no   Bowel habits - normal  GI bleeding - none  NSAID usage - none    Interval HPI 2020:  The patient's last visit with me was on 2015.      ROS:  Constitutional: No fevers, chills, No weight loss  ENT:  Positive for heartburn no dysphagia positive for odynophagia negative for hoarseness  CV: No exertional chest pain, no palpitation  Pulm: No cough, No shortness of breath, no wheezing  GI: see HPI  MSK: No significant arthritis  : No dysuria, No hematuria  Endo: No hot or cold intolerance  Neuro: No syncope, No seizure, no strokes      Medical History:  has a past medical history of Cancer, Cataract, and Skin cancer.    Surgical History:  has a past surgical history that includes [place] tooth extraction and  section.    Family History: family history is not on file..      Social History:  reports that she has never smoked. She does not have any smokeless tobacco history on file. She reports that she drinks about 7.0 standard drinks of alcohol per week.    Review of patient's allergies indicates:   Allergen Reactions    Erythromycin     Penicillins        Medication List with Changes/Refills   Current Medications    PANTOPRAZOLE (PROTONIX) 20 MG TABLET    TAKE 1 TABLET BY MOUTH DAILY BEFORE BREAKFAST         Objective Findings:    Vital Signs:  There were no vitals taken for this visit.  There is no height or weight on file to calculate BMI.    Physical Exam:  Telephone visit  General Appearance: Well appearing in no acute distress  Neurologic:  Alert and oriented x4  Psychiatric:  Normal speech mentation and affect    Labs:  Lab Results   Component Value Date    WBC 6.68 06/14/2017    HGB 13.4 06/14/2017    HCT 39.9 06/14/2017     06/14/2017    ALT 20 08/27/2015    AST 24 08/27/2015     08/27/2015    K 3.8 08/27/2015     08/27/2015    CREATININE 0.7 08/27/2015    BUN 10 08/27/2015    CO2 27 08/27/2015                Medical Decision Making:  Symptoms are consistent with medication induced esophagitis/esophageal ulcer  Clindamycin being a con min medication very like you her doxycycline did not cause it  Patient informed to avoid medication that causes it if possible or to make sure  She swallows water and lubricate her esophagus prior to her swallowing her medication  And drink plenty medication after an do not lie down within 1-2 hours after  His symptoms persist she needs to switch her medicine to liquid form her to ask her doctor she needs to continue it  She can continue to take her PPI she can up it to 40 mg once daily.  Or if she feels like she wants to stay on 20 mg a day  An add her Pepcid once daily she can do that as well avoid alcohol if he had seems to be bothering her  Patient will message me on Monday March 30th the let me if she doing  better  She knows symptoms can last 7-10 days that is assuming not really injuring it.  We do recommend a follow-up EGD at some point to check for erosive esophagitis healing.  She says she will call to schedule  He also reminder that she is due for his screening colonoscopy December of this year.  She will call to schedule that as well.  She will let us know if symptoms worsen before Monday  Up-to-date information on medication induced esophagitis male to patient  Long-term PPI talk given  Assessment:  1. Odynophagia    2. Esophagitis due to drug    3. Hiatal hernia    4. Encounter for monitoring long-term proton pump inhibitor therapy         Recommendations:   1.  Sudden onset of odynophagia after swallowing a clindamycin antibiotic pill at 11:00 p.m. at night with little water and lying down immediately.  Symptoms have improved greatly each day since Sunday March 22nd.  No alarm symptoms.  No exertional chest pain.  Odynophagia with eating is slowly getting better  But much improved.  Patient will follow recommendations from up-to-date on medication induced esophagitis.  Patient will call me on Monday to check in sooner if symptoms worsen.  Patient knows to call to schedule EGD follow-up of erosive esophagitis  Next available currently stay [place] is under a mandate for no non emergent endoscopies until at least April 21, 2020.  2.  Next screening colonoscopy December 2020.  Patient will call to schedule.  3.  Once yearly on long-term PPI patient should have her primary care doctor in [place] ordered creatinine vitamin B12 vitamin-D magnesium.  4..  Return GI clinic p.r.n. or failing to get completely better    No follow-ups on file.      Order summary:         Thank you so much for allowing me to participate in the care of Misty Whitaker MD

## 2020-05-22 DIAGNOSIS — K22.10 ESOPHAGITIS, EROSIVE: ICD-10-CM

## 2020-05-22 RX ORDER — PANTOPRAZOLE SODIUM 20 MG/1
TABLET, DELAYED RELEASE ORAL
Qty: 90 TABLET | Refills: 0 | Status: SHIPPED | OUTPATIENT
Start: 2020-05-22 | End: 2020-07-18

## 2020-07-18 ENCOUNTER — TELEPHONE (OUTPATIENT)
Dept: GASTROENTEROLOGY | Facility: CLINIC | Age: 60
End: 2020-07-18

## 2020-07-18 DIAGNOSIS — Z79.899 ENCOUNTER FOR MONITORING LONG-TERM PROTON PUMP INHIBITOR THERAPY: Primary | ICD-10-CM

## 2020-07-18 DIAGNOSIS — Z51.81 ENCOUNTER FOR MONITORING LONG-TERM PROTON PUMP INHIBITOR THERAPY: Primary | ICD-10-CM

## 2020-07-18 DIAGNOSIS — K22.10 ESOPHAGITIS, EROSIVE: ICD-10-CM

## 2020-07-18 RX ORDER — PANTOPRAZOLE SODIUM 20 MG/1
20 TABLET, DELAYED RELEASE ORAL
Qty: 90 TABLET | Refills: 1 | Status: SHIPPED | OUTPATIENT
Start: 2020-07-18 | End: 2021-02-15 | Stop reason: SDUPTHER

## 2020-07-20 NOTE — TELEPHONE ENCOUNTER
Patient had labs done at an outside facility and will upload the results to her portal account. Patient was given the number to our endoscopy schedulers.

## 2020-08-27 ENCOUNTER — PATIENT MESSAGE (OUTPATIENT)
Dept: GASTROENTEROLOGY | Facility: CLINIC | Age: 60
End: 2020-08-27

## 2021-02-15 DIAGNOSIS — Z51.81 ENCOUNTER FOR MONITORING LONG-TERM PROTON PUMP INHIBITOR THERAPY: ICD-10-CM

## 2021-02-15 DIAGNOSIS — Z79.899 ENCOUNTER FOR MONITORING LONG-TERM PROTON PUMP INHIBITOR THERAPY: ICD-10-CM

## 2021-02-15 DIAGNOSIS — K22.10 ESOPHAGITIS, EROSIVE: ICD-10-CM

## 2021-02-16 RX ORDER — PANTOPRAZOLE SODIUM 20 MG/1
20 TABLET, DELAYED RELEASE ORAL
Qty: 90 TABLET | Refills: 1 | Status: SHIPPED | OUTPATIENT
Start: 2021-02-16 | End: 2021-08-15

## 2022-05-19 ENCOUNTER — PATIENT MESSAGE (OUTPATIENT)
Dept: GASTROENTEROLOGY | Facility: CLINIC | Age: 62
End: 2022-05-19
Payer: COMMERCIAL

## 2022-05-20 ENCOUNTER — OFFICE VISIT (OUTPATIENT)
Dept: GASTROENTEROLOGY | Facility: CLINIC | Age: 62
End: 2022-05-20
Payer: COMMERCIAL

## 2022-05-20 ENCOUNTER — PATIENT MESSAGE (OUTPATIENT)
Dept: GASTROENTEROLOGY | Facility: CLINIC | Age: 62
End: 2022-05-20

## 2022-05-20 VITALS — HEIGHT: 64 IN | BODY MASS INDEX: 21.34 KG/M2 | WEIGHT: 125 LBS

## 2022-05-20 DIAGNOSIS — K22.10 EROSIVE ESOPHAGITIS: ICD-10-CM

## 2022-05-20 DIAGNOSIS — Z79.899 ENCOUNTER FOR MONITORING LONG-TERM PROTON PUMP INHIBITOR THERAPY: ICD-10-CM

## 2022-05-20 DIAGNOSIS — K57.92 ACUTE DIVERTICULITIS: Primary | ICD-10-CM

## 2022-05-20 DIAGNOSIS — Z51.81 ENCOUNTER FOR MONITORING LONG-TERM PROTON PUMP INHIBITOR THERAPY: ICD-10-CM

## 2022-05-20 PROCEDURE — 3008F PR BODY MASS INDEX (BMI) DOCUMENTED: ICD-10-PCS | Mod: CPTII,95,, | Performed by: INTERNAL MEDICINE

## 2022-05-20 PROCEDURE — 99214 OFFICE O/P EST MOD 30 MIN: CPT | Mod: 95,,, | Performed by: INTERNAL MEDICINE

## 2022-05-20 PROCEDURE — 3008F BODY MASS INDEX DOCD: CPT | Mod: CPTII,95,, | Performed by: INTERNAL MEDICINE

## 2022-05-20 PROCEDURE — 99214 PR OFFICE/OUTPT VISIT, EST, LEVL IV, 30-39 MIN: ICD-10-PCS | Mod: 95,,, | Performed by: INTERNAL MEDICINE

## 2022-05-20 NOTE — PROGRESS NOTES
The patient location is: At a [facility]  The chief complaint leading to consultation is: Acute Diverticulitis    Visit type: Telemedicine video visit    Face to Face time with patient: 30 minutes of total time spent on the encounter, which includes face to face time and non-face to face time preparing to see the patient (eg, review of tests), Obtaining and/or reviewing separately obtained history, Documenting clinical information in the electronic or other health record, Independently interpreting results (not separately reported) and communicating results to the patient/family/caregiver, or Care coordination (not separately reported).         Each patient to whom he or she provides medical services by telemedicine is:  (1) informed of the relationship between the physician and patient and the respective role of any other health care provider with respect to management of the patient; and (2) notified that he or she may decline to receive medical services by telemedicine and may withdraw from such care at any time.    Notes:           [facility] Consultation Note    Reason for Consult:  The primary encounter diagnosis was Acute diverticulitis. Diagnoses of Encounter for monitoring long-term proton pump inhibitor therapy and Erosive esophagitis were also pertinent to this visit.    PCP:   Primary Doctor No       Referring MD:  No referring provider defined for this encounter.    Initial History of Present Illness (HPI):  This is a 62 y.o. female here for evaluation of recent diagnosis uncomplicated transverse colon diverticulitis by CT scan on 05/17/2022 done at [facility] in [place], [place] ordered by primary care doctor patient says that on 05/14/2022 gradually around lunchtime she started developing just to the right of her umbilicus and slightly inferior abdominal pain at its worst it was 5/10 she followed up with her primary care doctor on Tuesday 05/17/2022 she was worried  that it could be appendicitis did a CT scan with IV contrast that same day the report was uncomplicated transverse colon diverticulitis patient's primary care doctor started her on Flagyl 500 mg Q 8 hours for 10 days along with Bactrim DS 1 pill q.12 hours for 10 days.  Patient is on a bland diet her pain was a 5/10 it is down to a 3/10 she is on day 3 of her antibiotics recommend she go to a full liquid diet until her pain pretty much has resolved to stay well hydrated she has avoided NSAIDs she was not taking any NSAIDs prior to this she is going to avoid strenuous exercise or heavy lifting she knows that she should have a follow-up colonoscopy in about 6-8 weeks will also do an EGD at the time she is Mr. Follow-up recommended EGD to check for erosive esophagitis healing she is doing well on her PPI not have any dysphagia no odynophagia no GERD symptoms no nausea vomiting no fever no chills no shortness of breath no dysuria no blood in her stool overall no for weight loss no history of abdominal trauma this is a 1st diagnosis of acute diverticulitis no family history of colon cancer no family history of advanced colon adenomas polyps she thinks her last colonoscopy was about 7 years ago she does not think she has ever had colon polyps no family history of colon cancer no family history of FAP or attenuated FAP or MA P or Mares syndrome.    Abdominal pain - as above  Reflux - no  Dysphagia - no   Bowel habits - normal bowel movement every other day no blood in stool  GI bleeding - none  NSAID usage - none    Interval HPI 05/20/2022:  The patient's last visit with me was on 3/25/2020.      ROS:  Constitutional: No fevers, chills, No weight loss  ENT:  No heartburn no dysphagia no odynophagia no hoarseness  CV: No chest pain, no palpitation  Pulm: No cough, No shortness of breath, no wheezing  Ophtho: No vision changes  GI: see HPI  Derm: No rash, no itching  Heme: No lymphadenopathy, No easy bruising  MSK: No  "significant arthritis  : No dysuria has been followed over the years for microscopic hematuria by her urologist  Endo: No hot or cold intolerance  Neuro: No syncope, No seizure, no strokes  Psych: No uncontrolled anxiety, No uncontrolled depression    Medical History:  has a past medical history of Cancer, Cataract, and Skin cancer.    Surgical History:  has a past surgical history that includes [place] tooth extraction and  section.    Family History: family history includes Pancreatic cancer (age of onset: 65) in her maternal grandfather..     Social History:  reports that she has never smoked. She does not have any smokeless tobacco history on file. She reports current alcohol use of about 7.0 standard drinks of alcohol per week. She reports that she does not use drugs.    Review of patient's allergies indicates:   Allergen Reactions    Erythromycin     Penicillins        Medication List with Changes/Refills   Current Medications    PANTOPRAZOLE (PROTONIX) 20 MG TABLET    Take 1 tablet (20 mg total) by mouth before breakfast.         Objective Findings:    Vital Signs:  Ht 5' 4" (1.626 m)   Wt 56.7 kg (125 lb)   BMI 21.46 kg/m²   Body mass index is 21.46 kg/m².    Physical Exam:  Telemedicine video visit  General Appearance: Well appearing in no acute distress  Eyes:    No scleral icterus  Neurologic:  Alert and oriented x4  Psychiatric:  Normal speech mentation and affect    Labs:  Lab Results   Component Value Date    WBC 6.68 2017    HGB 13.4 2017    HCT 39.9 2017     2017    ALT 20 2015    AST 24 2015     2015    K 3.8 2015     2015    CREATININE 0.7 2015    BUN 10 2015    CO2 27 2015               Medical Decision Making:  Acute diverticulitis talk given  Avoidance of NSAIDs not needed for cardiovascular protection talk given  Thing well hydrated  Avoiding exercise in the short term  Follow-up colonoscopy " 6-8 weeks after talk given  Erosive esophagitis talk given follow-up EGD talk given can do EGD colonoscopy at the same time  Patient will continue her antibiotics for at least 7 days if she feels like she is not completely heal she will take the full 10 day course  Requested patient Rif affects CT scan and lab work to review  Assessment:  1. Acute diverticulitis    2. Encounter for monitoring long-term proton pump inhibitor therapy    3. Erosive esophagitis         Recommendations:  1. Full liquid diet until feeling pain free patient will continue taking her antibiotics prescribed by her primary care doctor which is ciprofloxacin and Bactrim for at least 7 days although she is not feeling back to her baseline at day 7 she will continue for her full 10 day course that was represcribe by her primary care doctor.  If pain worsens or develops fever she knows to seek emergent care through the ER for further evaluation.  2. Recommend follow-up colonoscopy 6-8 weeks from now orders placed  3. History of erosive esophagitis patient is past due on repeat EGD to check for esophagitis healing and [place]'s screening orders placed can be done same time as colonoscopy.  4. Return GI clinic 2 weeks telemedicine video visit for follow-up acute diverticulitis      No follow-ups on file.      Order summary:         Thank you so much for allowing me to participate in the care of Misty Kala Whitaker MD

## 2022-05-20 NOTE — Clinical Note
Guzman can we set a Misty with a telemedicine video visit for 2 weeks for follow-up diverticulitis  Orders for EGD colonoscopy placed to be done in about 8 weeks from today tell out time for diverticulitis to heal  Thank you

## 2022-06-03 ENCOUNTER — PATIENT MESSAGE (OUTPATIENT)
Dept: GASTROENTEROLOGY | Facility: CLINIC | Age: 62
End: 2022-06-03
Payer: COMMERCIAL

## 2022-06-22 ENCOUNTER — TELEPHONE (OUTPATIENT)
Dept: ENDOSCOPY | Facility: HOSPITAL | Age: 62
End: 2022-06-22
Payer: COMMERCIAL

## 2022-08-10 DIAGNOSIS — Z12.11 SPECIAL SCREENING FOR MALIGNANT NEOPLASMS, COLON: Primary | ICD-10-CM

## 2022-08-10 RX ORDER — SOD SULF/POT CHLORIDE/MAG SULF 1.479 G
12 TABLET ORAL DAILY
Qty: 24 TABLET | Refills: 0 | Status: ON HOLD | OUTPATIENT
Start: 2022-08-10 | End: 2023-03-16 | Stop reason: HOSPADM

## 2022-09-26 ENCOUNTER — TELEPHONE (OUTPATIENT)
Dept: ENDOSCOPY | Facility: HOSPITAL | Age: 62
End: 2022-09-26
Payer: COMMERCIAL

## 2022-10-05 DIAGNOSIS — K21.9 GASTROESOPHAGEAL REFLUX DISEASE WITHOUT ESOPHAGITIS: Primary | ICD-10-CM

## 2022-11-01 ENCOUNTER — CLINICAL SUPPORT (OUTPATIENT)
Dept: ENDOSCOPY | Facility: HOSPITAL | Age: 62
End: 2022-11-01
Attending: INTERNAL MEDICINE
Payer: COMMERCIAL

## 2022-11-01 VITALS — HEIGHT: 64 IN | BODY MASS INDEX: 21.34 KG/M2 | WEIGHT: 125 LBS

## 2022-11-01 DIAGNOSIS — K21.9 GASTROESOPHAGEAL REFLUX DISEASE WITHOUT ESOPHAGITIS: ICD-10-CM

## 2022-11-01 RX ORDER — ROSUVASTATIN CALCIUM 10 MG/1
10 TABLET, COATED ORAL
COMMUNITY
Start: 2021-11-15

## 2022-11-15 ENCOUNTER — TELEPHONE (OUTPATIENT)
Dept: ENDOSCOPY | Facility: HOSPITAL | Age: 62
End: 2022-11-15
Payer: COMMERCIAL

## 2022-11-16 ENCOUNTER — ANESTHESIA (OUTPATIENT)
Dept: ENDOSCOPY | Facility: HOSPITAL | Age: 62
End: 2022-11-16
Payer: COMMERCIAL

## 2022-11-16 ENCOUNTER — HOSPITAL ENCOUNTER (OUTPATIENT)
Facility: HOSPITAL | Age: 62
Discharge: HOME | End: 2022-11-16
Attending: INTERNAL MEDICINE | Admitting: INTERNAL MEDICINE
Payer: COMMERCIAL

## 2022-11-16 ENCOUNTER — NURSE TRIAGE (OUTPATIENT)
Dept: ADMINISTRATIVE | Facility: CLINIC | Age: 62
End: 2022-11-16
Payer: COMMERCIAL

## 2022-11-16 ENCOUNTER — ANESTHESIA EVENT (OUTPATIENT)
Dept: ENDOSCOPY | Facility: HOSPITAL | Age: 62
End: 2022-11-16
Payer: COMMERCIAL

## 2022-11-16 VITALS
RESPIRATION RATE: 18 BRPM | HEART RATE: 61 BPM | SYSTOLIC BLOOD PRESSURE: 144 MMHG | WEIGHT: 122 LBS | DIASTOLIC BLOOD PRESSURE: 67 MMHG | OXYGEN SATURATION: 100 % | HEIGHT: 64 IN | BODY MASS INDEX: 20.83 KG/M2 | TEMPERATURE: 99 F

## 2022-11-16 DIAGNOSIS — K22.10 EROSIVE ESOPHAGITIS: ICD-10-CM

## 2022-11-16 DIAGNOSIS — K21.9 GASTROESOPHAGEAL REFLUX DISEASE, UNSPECIFIED WHETHER ESOPHAGITIS PRESENT: ICD-10-CM

## 2022-11-16 DIAGNOSIS — K27.9 PUD (PEPTIC ULCER DISEASE): Primary | ICD-10-CM

## 2022-11-16 PROCEDURE — 88342 IMHCHEM/IMCYTCHM 1ST ANTB: CPT | Mod: 26,,, | Performed by: PATHOLOGY

## 2022-11-16 PROCEDURE — 88342 CHG IMMUNOCYTOCHEMISTRY: ICD-10-PCS | Mod: 26,,, | Performed by: PATHOLOGY

## 2022-11-16 PROCEDURE — 45385 COLONOSCOPY W/LESION REMOVAL: CPT | Mod: PT | Performed by: INTERNAL MEDICINE

## 2022-11-16 PROCEDURE — 88305 TISSUE EXAM BY PATHOLOGIST: ICD-10-PCS | Mod: 26,,, | Performed by: PATHOLOGY

## 2022-11-16 PROCEDURE — 37000009 HC ANESTHESIA EA ADD 15 MINS: Performed by: INTERNAL MEDICINE

## 2022-11-16 PROCEDURE — 27201012 HC FORCEPS, HOT/COLD, DISP: Performed by: INTERNAL MEDICINE

## 2022-11-16 PROCEDURE — E9220 PRA ENDO ANESTHESIA: HCPCS | Mod: 33,,, | Performed by: NURSE ANESTHETIST, CERTIFIED REGISTERED

## 2022-11-16 PROCEDURE — 25000003 PHARM REV CODE 250: Performed by: INTERNAL MEDICINE

## 2022-11-16 PROCEDURE — 45385 PR COLONOSCOPY,REMV LESN,SNARE: ICD-10-PCS | Mod: 33,,, | Performed by: INTERNAL MEDICINE

## 2022-11-16 PROCEDURE — 88305 TISSUE EXAM BY PATHOLOGIST: CPT | Mod: 59 | Performed by: PATHOLOGY

## 2022-11-16 PROCEDURE — 63600175 PHARM REV CODE 636 W HCPCS: Performed by: NURSE ANESTHETIST, CERTIFIED REGISTERED

## 2022-11-16 PROCEDURE — 27201089 HC SNARE, DISP (ANY): Performed by: INTERNAL MEDICINE

## 2022-11-16 PROCEDURE — 25000003 PHARM REV CODE 250: Performed by: NURSE ANESTHETIST, CERTIFIED REGISTERED

## 2022-11-16 PROCEDURE — 43239 EGD BIOPSY SINGLE/MULTIPLE: CPT | Mod: 51,,, | Performed by: INTERNAL MEDICINE

## 2022-11-16 PROCEDURE — 43239 PR EGD, FLEX, W/BIOPSY, SGL/MULTI: ICD-10-PCS | Mod: 51,,, | Performed by: INTERNAL MEDICINE

## 2022-11-16 PROCEDURE — 88305 TISSUE EXAM BY PATHOLOGIST: CPT | Mod: 26,,, | Performed by: PATHOLOGY

## 2022-11-16 PROCEDURE — 43239 EGD BIOPSY SINGLE/MULTIPLE: CPT | Performed by: INTERNAL MEDICINE

## 2022-11-16 PROCEDURE — 88342 IMHCHEM/IMCYTCHM 1ST ANTB: CPT | Performed by: PATHOLOGY

## 2022-11-16 PROCEDURE — 45385 COLONOSCOPY W/LESION REMOVAL: CPT | Mod: 33,,, | Performed by: INTERNAL MEDICINE

## 2022-11-16 PROCEDURE — 37000008 HC ANESTHESIA 1ST 15 MINUTES: Performed by: INTERNAL MEDICINE

## 2022-11-16 PROCEDURE — E9220 PRA ENDO ANESTHESIA: ICD-10-PCS | Mod: 33,,, | Performed by: NURSE ANESTHETIST, CERTIFIED REGISTERED

## 2022-11-16 RX ORDER — SODIUM CHLORIDE 9 MG/ML
INJECTION, SOLUTION INTRAVENOUS CONTINUOUS
Status: DISCONTINUED | OUTPATIENT
Start: 2022-11-16 | End: 2022-11-16 | Stop reason: HOSPADM

## 2022-11-16 RX ORDER — LIDOCAINE HYDROCHLORIDE 20 MG/ML
INJECTION INTRAVENOUS
Status: DISCONTINUED | OUTPATIENT
Start: 2022-11-16 | End: 2022-11-16

## 2022-11-16 RX ORDER — PANTOPRAZOLE SODIUM 40 MG/1
40 TABLET, DELAYED RELEASE ORAL
Qty: 90 TABLET | Refills: 3 | Status: SHIPPED | OUTPATIENT
Start: 2022-11-16 | End: 2023-10-18

## 2022-11-16 RX ORDER — PROPOFOL 10 MG/ML
VIAL (ML) INTRAVENOUS
Status: DISCONTINUED | OUTPATIENT
Start: 2022-11-16 | End: 2022-11-16

## 2022-11-16 RX ORDER — PROPOFOL 10 MG/ML
VIAL (ML) INTRAVENOUS CONTINUOUS PRN
Status: DISCONTINUED | OUTPATIENT
Start: 2022-11-16 | End: 2022-11-16

## 2022-11-16 RX ADMIN — LIDOCAINE HYDROCHLORIDE 100 MG: 20 INJECTION INTRAVENOUS at 01:11

## 2022-11-16 RX ADMIN — PROPOFOL 30 MG: 10 INJECTION, EMULSION INTRAVENOUS at 01:11

## 2022-11-16 RX ADMIN — GLYCOPYRROLATE 0.2 MG: 0.2 INJECTION, SOLUTION INTRAMUSCULAR; INTRAVITREAL at 12:11

## 2022-11-16 RX ADMIN — SODIUM CHLORIDE: 0.9 INJECTION, SOLUTION INTRAVENOUS at 12:11

## 2022-11-16 RX ADMIN — PROPOFOL 20 MG: 10 INJECTION, EMULSION INTRAVENOUS at 01:11

## 2022-11-16 RX ADMIN — PROPOFOL 200 MCG/KG/MIN: 10 INJECTION, EMULSION INTRAVENOUS at 01:11

## 2022-11-16 RX ADMIN — PROPOFOL 50 MG: 10 INJECTION, EMULSION INTRAVENOUS at 01:11

## 2022-11-16 RX ADMIN — PROPOFOL 70 MG: 10 INJECTION, EMULSION INTRAVENOUS at 01:11

## 2022-11-16 NOTE — ANESTHESIA POSTPROCEDURE EVALUATION
Anesthesia Post Evaluation    Patient: Misty Armendariz    Procedure(s) Performed: Procedure(s) (LRB):  EGD (ESOPHAGOGASTRODUODENOSCOPY) (N/A)  COLONOSCOPY (N/A)    Final Anesthesia Type: general      Patient location during evaluation: GI PACU  Patient participation: Yes- Able to Participate  Level of consciousness: awake and alert  Post-procedure vital signs: reviewed and stable  Pain management: adequate  Airway patency: patent    PONV status at discharge: No PONV  Anesthetic complications: no      Cardiovascular status: blood pressure returned to baseline  Respiratory status: unassisted  Hydration status: euvolemic  Follow-up not needed.          Vitals Value Taken Time   /67 11/16/22 1420   Temp 37.1 °C (98.7 °F) 11/16/22 1352   Pulse 61 11/16/22 1420   Resp 18 11/16/22 1420   SpO2 100 % 11/16/22 1420         No case tracking events are documented in the log.      Pain/Carrie Score: Carrie Score: 10 (11/16/2022  2:10 PM)

## 2022-11-16 NOTE — TRANSFER OF CARE
"Anesthesia Transfer of Care Note    Patient: Misty Armendariz    Procedure(s) Performed: Procedure(s) (LRB):  EGD (ESOPHAGOGASTRODUODENOSCOPY) (N/A)  COLONOSCOPY (N/A)    Patient location: GI    Anesthesia Type: general    Transport from OR: Transported from OR on room air with adequate spontaneous ventilation    Post pain: adequate analgesia    Post assessment: no apparent anesthetic complications and tolerated procedure well    Post vital signs: stable    Level of consciousness: awake, alert and oriented    Nausea/Vomiting: no nausea/vomiting    Complications: none    Transfer of care protocol was followed      Last vitals:   Visit Vitals  BP (!) 108/59   Pulse 79   Temp 36.7 °C (98.1 °F) (Temporal)   Resp 16   Ht 5' 4" (1.626 m)   Wt 55.3 kg (122 lb)   SpO2 100%   Breastfeeding No   BMI 20.94 kg/m²     "

## 2022-11-16 NOTE — H&P
[facility] [address] [facility]  History & Physical    Subjective:      Chief Complaint/Reason for Admission:   EGD and colonoscopy    Misty Armendariz is a 62 y.o. female.    Past Medical History:   Diagnosis Date    Cancer     Cataract     surgery left eye, blepperplexy bilat eye    Digestive disorder     Skin cancer      Past Surgical History:   Procedure Laterality Date     SECTION      COLONOSCOPY      ESOPHAGOGASTRODUODENOSCOPY      EXCISION OF MELANOMA      WISDOM TOOTH EXTRACTION       Family History   Problem Relation Age of Onset    Pancreatic cancer Maternal Grandfather 65        Possibly pancreatic cancer    Celiac disease Neg Hx     Cirrhosis Neg Hx     Colon cancer Neg Hx     Colon polyps Neg Hx     Crohn's disease Neg Hx     Cystic fibrosis Neg Hx     Esophageal cancer Neg Hx     Hemochromatosis Neg Hx     Inflammatory bowel disease Neg Hx     Irritable bowel syndrome Neg Hx     Liver cancer Neg Hx     Liver disease Neg Hx     Rectal cancer Neg Hx     Stomach cancer Neg Hx     Ulcerative colitis Neg Hx     Galicia's esophagus Neg Hx     Pancreatitis Neg Hx     Kidney cancer Neg Hx     Bladder Cancer Neg Hx     Uterine cancer Neg Hx     Ovarian cancer Neg Hx      Social History     Tobacco Use    Smoking status: Never   Substance Use Topics    Alcohol use: Yes     Alcohol/week: 7.0 standard drinks     Types: 4 Glasses of wine, 3 Shots of liquor per week     Comment: weekly     Drug use: Never       PTA Medications   Medication Sig    pantoprazole (PROTONIX) 20 MG tablet Take 1 tablet (20 mg total) by mouth before breakfast.    rosuvastatin (CRESTOR) 10 MG tablet Take 10 mg by mouth.    sod sulf-pot chloride-mag sulf (SUTAB) 1.479-0.188- 0.225 gram tablet Take 12 tablets by mouth once daily. BIN:[id number]:[id number]     Review of patient's allergies indicates:   Allergen Reactions    Erythromycin     Penicillins         Review of Systems   Constitutional:  Negative for  fever.   Respiratory:  Negative for shortness of breath.    Cardiovascular:  Negative for chest pain.     Objective:      Vital Signs (Most Recent)  Temp: 98.1 °F (36.7 °C) (11/16/22 1243)  Pulse: 76 (11/16/22 1243)  Resp: 16 (11/16/22 1243)  BP: (!) 163/76 (11/16/22 1243)  SpO2: 100 % (11/16/22 1243)    Vital Signs Range (Last 24H):  Temp:  [98.1 °F (36.7 °C)]   Pulse:  [76]   Resp:  [16]   BP: (163)/(76)   SpO2:  [100 %]     Physical Exam  Constitutional:       Appearance: Normal appearance.   Cardiovascular:      Rate and Rhythm: Normal rate.   Pulmonary:      Effort: Pulmonary effort is normal.   Neurological:      Mental Status: She is alert and oriented to person, place, and time.           Assessment:    Assessment:  1. Acute diverticulitis    2. Encounter for monitoring long-term proton pump inhibitor therapy    3. Erosive esophagitis       Plan:      EGD and colonoscopy for follow up erosive esophagitis and follow up diverticultitis

## 2022-11-16 NOTE — TELEPHONE ENCOUNTER
"Has Colonoscopy today for 12:40pm  Pt is doing her Sutabs, 2nd dose is due at 5am.  Pt states her heart skips beats a few times a year and states she feels "heart flutters" this am.  Care advice states to see a provider within 4 hours or pcp triage/consult.   Pt understands but wants to call doctor to ask if she should proceed with 5am dose of Sutab.  Dr. Cammy Obregon was called as the pt states her pharmacy paperwork states to notify MD if this symptom occurs.  Dr. Obregon states OK to proceed with next dose at 5am and call back for any issues.  This was explained to the pt, she verbally understands, all questions answered.  Pt was advised to call back for any worsening symptoms.      Reason for Disposition   Age > 60 years (Exception: brief heartbeat symptoms that went away and now feels well)    Additional Information   Negative: Passed out (i.e., lost consciousness, collapsed and was not responding)   Negative: Shock suspected (e.g., cold/pale/clammy skin, too weak to stand, low BP, rapid pulse)   Negative: Difficult to awaken or acting confused (e.g., disoriented, slurred speech)   Negative: Visible sweat on face or sweat dripping down face   Negative: Unable to walk, or can only walk with assistance (e.g., requires support)   Negative: [1] Received SHOCK from implantable cardiac defibrillator AND [2] persisting symptoms (i.e., palpitations, lightheadedness)   Negative: [1] Dizziness, lightheadedness, or weakness AND [2] heart beating very rapidly (e.g., > 140 / minute)   Negative: [1] Dizziness, lightheadedness, or weakness AND [2] heart beating very slowly (e.g., < 50 / minute)   Negative: Sounds like a life-threatening emergency to the triager   Negative: Difficulty breathing   Negative: Dizziness, lightheadedness, or weakness   Negative: [1] Heart beating very rapidly (e.g., > 140 / minute) AND [2] present now  (Exception: during exercise)   Negative: Heart beating very slowly (e.g., < 50 / minute)  " (Exception: athlete and heart rate normal for caller)   Negative: New or worsened shortness of breath with activity (dyspnea on exertion)   Negative: Patient sounds very sick or weak to the triager   Negative: [1] Heart beating very rapidly (e.g., > 140 / minute) AND [2] not present now  (Exception: during exercise)   Negative: [1] Skipped or extra beat(s) AND [2] increases with exercise or exertion   Negative: [1] Skipped or extra beat(s) AND [2] occurs 4 or more times per minute   Negative: New or worsened ankle swelling   Negative: History of heart disease (i.e., heart attack, bypass surgery, angina, angioplasty, CHF) (Exception: brief heartbeat symptoms that went away and now feels well)    Protocols used: Heart Rate and Heartbeat [id number]

## 2022-11-16 NOTE — PROVATION PATIENT INSTRUCTIONS
Discharge Summary/Instructions after an Endoscopic Procedure  Patient Name: Misty Armendariz  Patient MRN: [id number]  Patient YOB: 1960 Wednesday, November 16, 2022  Solomon Whitaker MD  Dear patient,  As a result of recent federal legislation (The Federal Cures Act), you may   receive lab or pathology results from your procedure in your [facility]   account before your physician is able to contact you. Your physician or   their representative will relay the results to you with their   recommendations at their soonest availability.  Thank you,  RESTRICTIONS:  During your procedure today, you received medications for sedation.  These   medications may affect your judgment, balance and coordination.  Therefore,   for 24 hours, you have the following restrictions:   - DO NOT drive a car, operate machinery, make legal/financial decisions,   sign important papers or drink alcohol.    ACTIVITY:  Today: no heavy lifting, straining or running due to procedural   sedation/anesthesia.  The following day: return to full activity including work.  DIET:  Eat and drink normally unless instructed otherwise.     TREATMENT FOR COMMON SIDE EFFECTS:  - Mild abdominal pain, nausea, belching, bloating or excessive gas:  rest,   eat lightly and use a heating pad.  - Sore Throat: treat with throat lozenges and/or gargle with warm salt   water.  - Because air was used during the procedure, expelling large amounts of air   from your rectum or belching is normal.  - If a bowel prep was taken, you may not have a bowel movement for 1-3 days.    This is normal.  SYMPTOMS TO WATCH FOR AND REPORT TO YOUR PHYSICIAN:  1. Abdominal pain or bloating, other than gas cramps.  2. Chest pain.  3. Back pain.  4. Signs of infection such as: chills or fever occurring within 24 hours   after the procedure.  5. Rectal bleeding, which would show as bright red, maroon, or black stools.   (A tablespoon of blood from the rectum is not serious, especially  if   hemorrhoids are present.)  6. Vomiting.  7. Weakness or dizziness.  GO DIRECTLY TO THE NEAREST EMERGENCY ROOM IF YOU HAVE ANY OF THE FOLLOWING:      Difficulty breathing              Chills and/or fever over 101 F   Persistent vomiting and/or vomiting blood   Severe abdominal pain   Severe chest pain   Black, tarry stools   Bleeding- more than one tablespoon   Any other symptom or condition that you feel may need urgent attention  Your doctor recommends these additional instructions:  If any biopsies were taken, your doctors clinic will contact you in 1 to 2   weeks with any results.  - Discharge patient to home.   - Await pathology results.   - Repeat colonoscopy in 7 years for surveillance based on pathology results.     - Return to GI clinic.   - The findings and recommendations were discussed with the patient.  For questions, problems or results please call your physician - Solomon Whitaker MD at Work:  [phone].  [facility], EMERGENCY ROOM PHONE NUMBER: [phone]  IF A COMPLICATION OR EMERGENCY SITUATION ARISES AND YOU ARE UNABLE TO REACH   YOUR PHYSICIAN - GO DIRECTLY TO THE EMERGENCY ROOM.  Solomon Whitaker MD  11/16/2022 1:52:47 PM  This report has been verified and signed electronically.  Dear patient,  As a result of recent federal legislation (The Federal Cures Act), you may   receive lab or pathology results from your procedure in your [facility]   account before your physician is able to contact you. Your physician or   their representative will relay the results to you with their   recommendations at their soonest availability.  Thank you,  PROVATION

## 2022-11-16 NOTE — PROVATION PATIENT INSTRUCTIONS
Discharge Summary/Instructions after an Endoscopic Procedure  Patient Name: Misty Armendariz  Patient MRN: [id number]  Patient YOB: 1960 Wednesday, November 16, 2022  Solomon Whitaker MD  Dear patient,  As a result of recent federal legislation (The Federal Cures Act), you may   receive lab or pathology results from your procedure in your [facility]   account before your physician is able to contact you. Your physician or   their representative will relay the results to you with their   recommendations at their soonest availability.  Thank you,  RESTRICTIONS:  During your procedure today, you received medications for sedation.  These   medications may affect your judgment, balance and coordination.  Therefore,   for 24 hours, you have the following restrictions:   - DO NOT drive a car, operate machinery, make legal/financial decisions,   sign important papers or drink alcohol.    ACTIVITY:  Today: no heavy lifting, straining or running due to procedural   sedation/anesthesia.  The following day: return to full activity including work.  DIET:  Eat and drink normally unless instructed otherwise.     TREATMENT FOR COMMON SIDE EFFECTS:  - Mild abdominal pain, nausea, belching, bloating or excessive gas:  rest,   eat lightly and use a heating pad.  - Sore Throat: treat with throat lozenges and/or gargle with warm salt   water.  - Because air was used during the procedure, expelling large amounts of air   from your rectum or belching is normal.  - If a bowel prep was taken, you may not have a bowel movement for 1-3 days.    This is normal.  SYMPTOMS TO WATCH FOR AND REPORT TO YOUR PHYSICIAN:  1. Abdominal pain or bloating, other than gas cramps.  2. Chest pain.  3. Back pain.  4. Signs of infection such as: chills or fever occurring within 24 hours   after the procedure.  5. Rectal bleeding, which would show as bright red, maroon, or black stools.   (A tablespoon of blood from the rectum is not serious, especially  if   hemorrhoids are present.)  6. Vomiting.  7. Weakness or dizziness.  GO DIRECTLY TO THE NEAREST EMERGENCY ROOM IF YOU HAVE ANY OF THE FOLLOWING:      Difficulty breathing              Chills and/or fever over 101 F   Persistent vomiting and/or vomiting blood   Severe abdominal pain   Severe chest pain   Black, tarry stools   Bleeding- more than one tablespoon   Any other symptom or condition that you feel may need urgent attention  Your doctor recommends these additional instructions:  If any biopsies were taken, your doctors clinic will contact you in 1 to 2   weeks with any results.  - Discharge patient to home.   - Follow an antireflux regimen indefinitely.   - Await pathology results.   - Telephone endoscopist for pathology results in 3 weeks.   - Consider avoiding all non-steroidal anti-inflammatory drugs (aspirin,   ibuprofen, naproxen, etc.), unless needed for cardiovascular protection.    Recommend you discuss with your prescribing doctor (of your aspirin) to see   if cardiovascular benefits of your aspirin outweigh the risks of GI   bleeding.  - Use Protonix 40 mg once daily (or any other full strength proton pump   inhibitor) - best taken 45 minutes before your first protein containing   meal.   - Repeat upper endoscopy in 8 weeks to check healing.   - Return to GI clinic.   - The findings and recommendations were discussed with the patient.  For questions, problems or results please call your physician - Solomon Whitakre MD at Work:  [phone].  [facility], EMERGENCY ROOM PHONE NUMBER: [phone]  IF A COMPLICATION OR EMERGENCY SITUATION ARISES AND YOU ARE UNABLE TO REACH   YOUR PHYSICIAN - GO DIRECTLY TO THE EMERGENCY ROOM.  Solomon Whitaker MD  11/16/2022 1:20:21 PM  This report has been verified and signed electronically.  Dear patient,  As a result of recent federal legislation (The Federal Cures Act), you may   receive lab or pathology results from your procedure in your  [facility]   account before your physician is able to contact you. Your physician or   their representative will relay the results to you with their   recommendations at their soonest availability.  Thank you,  PROVATION

## 2022-11-16 NOTE — ANESTHESIA PREPROCEDURE EVALUATION
2022  Misty Armendariz is a 62 y.o., female.    Active Problem List with Overview Notes    Diagnosis Date Noted    Erosive esophagitis 10/25/2017    GERD (gastroesophageal reflux disease) 2017    Gastroesophageal reflux disease without esophagitis 2015    Encounter for long-term current use of medication 2015    Use of proton pump inhibitor therapy 2015    RUQ abdominal pain 2015     Past Surgical History:   Procedure Laterality Date     SECTION      WISDOM TOOTH EXTRACTION       Pre-op Assessment    I have reviewed the Patient Summary Reports.    I have reviewed the NPO Status.   I have reviewed the Medications.     Review of Systems  Anesthesia Hx:  No problems with previous Anesthesia    Social:  Non-Smoker, Social Alcohol Use    Hematology/Oncology:  Hematology Normal   Oncology Normal     EENT/Dental:EENT/Dental Normal   Cardiovascular:  Cardiovascular Normal   Denies Angina.    Pulmonary:  Pulmonary Normal  Denies Shortness of breath.    Renal/:  Renal/ Normal     Hepatic/GI:   PUD, GERD    Musculoskeletal:  Musculoskeletal Normal    Neurological:  Neurology Normal    Endocrine:  Endocrine Normal    Dermatological:  Skin Normal    Psych:  Psychiatric Normal           Physical Exam  General: Well nourished, Cooperative, Alert and Oriented    Airway:  Mallampati: II   Mouth Opening: Normal  TM Distance: Normal  Tongue: Normal  Neck ROM: Normal ROM    Dental:  Intact    Chest/Lungs:  Clear to auscultation, Normal Respiratory Rate    Heart:  Rate: Normal  Rhythm: Regular Rhythm        Anesthesia Plan  Type of Anesthesia, risks & benefits discussed:    Anesthesia Type: Gen Natural Airway  Intra-op Monitoring Plan: Standard ASA Monitors  Post Op Pain Control Plan: multimodal analgesia  Induction:  IV  Informed Consent: Informed consent signed with the  Patient and all parties understand the risks and agree with anesthesia plan.  All questions answered.   ASA Score: 2  Day of Surgery Review of History & Physical: H&P Update referred to the surgeon/provider.    Ready For Surgery From Anesthesia Perspective.     .

## 2022-11-29 LAB
FINAL PATHOLOGIC DIAGNOSIS: NORMAL
GROSS: NORMAL
Lab: NORMAL

## 2022-12-14 ENCOUNTER — PATIENT MESSAGE (OUTPATIENT)
Dept: GASTROENTEROLOGY | Facility: CLINIC | Age: 62
End: 2022-12-14
Payer: COMMERCIAL

## 2022-12-14 NOTE — PROGRESS NOTES
Misty remember to stay on your acid suppressive medicine in schedule your follow-up EGD 8 weeks from your last 1 to check for ulcer healing.    Your EGD colonoscopy pathology was benign but her colon polyp was a type of polyp that require sooner follow-up recommend you next surveillance colonoscopy in 7 years.     1. Random stomach, biopsy:       -  Gastric antral and oxyntic mucosa with findings of reactive   gastropathy       -  Immunohistochemical stain appropriate control is negative for   Helicobacter pylori   Comment:  The specimen shows foveolar hyperplasia with associated lamina   propria edema and vascular ectasia, consistent with reactive gastropathy.   This pattern of injury is often seen in the setting of bile reflux, alcohol   use, stress ulceration and NSAID/aspirin use. There is no evidence of atrophy   or intestinal metaplasia.  The specimen is negative for dysplasia or   malignancy.   2. Distal esophagus, biopsy:       -  Squamous and gastric cardia mucosa with mild chronic gastric carditis   Comment:  Evidence of Glaicia's esophagus is not identified.  Viral   inclusions are not identified. There is no evidence of dysplasia or   malignancy.   3. Cecal polyp (3 mm) x1, biopsy:       -  Sessile serrated polyp       -  Negative for dysplasia or malignancy   Comment: Interp By Ramila Devine M.D., Signed on 11/29/2022

## 2023-02-01 ENCOUNTER — CLINICAL SUPPORT (OUTPATIENT)
Dept: ENDOSCOPY | Facility: HOSPITAL | Age: 63
End: 2023-02-01
Attending: INTERNAL MEDICINE
Payer: COMMERCIAL

## 2023-02-01 VITALS — HEIGHT: 64 IN | BODY MASS INDEX: 21.34 KG/M2 | WEIGHT: 125 LBS

## 2023-02-01 DIAGNOSIS — K27.9 PUD (PEPTIC ULCER DISEASE): ICD-10-CM

## 2023-02-01 DIAGNOSIS — K21.9 GASTROESOPHAGEAL REFLUX DISEASE, UNSPECIFIED WHETHER ESOPHAGITIS PRESENT: ICD-10-CM

## 2023-02-20 ENCOUNTER — PATIENT MESSAGE (OUTPATIENT)
Dept: GASTROENTEROLOGY | Facility: CLINIC | Age: 63
End: 2023-02-20
Payer: COMMERCIAL

## 2023-03-16 ENCOUNTER — ANESTHESIA (OUTPATIENT)
Dept: ENDOSCOPY | Facility: HOSPITAL | Age: 63
End: 2023-03-16
Payer: COMMERCIAL

## 2023-03-16 ENCOUNTER — HOSPITAL ENCOUNTER (OUTPATIENT)
Facility: HOSPITAL | Age: 63
Discharge: HOME | End: 2023-03-16
Attending: INTERNAL MEDICINE | Admitting: INTERNAL MEDICINE
Payer: COMMERCIAL

## 2023-03-16 ENCOUNTER — ANESTHESIA EVENT (OUTPATIENT)
Dept: ENDOSCOPY | Facility: HOSPITAL | Age: 63
End: 2023-03-16
Payer: COMMERCIAL

## 2023-03-16 VITALS
TEMPERATURE: 98 F | SYSTOLIC BLOOD PRESSURE: 120 MMHG | RESPIRATION RATE: 16 BRPM | BODY MASS INDEX: 21.68 KG/M2 | DIASTOLIC BLOOD PRESSURE: 69 MMHG | HEART RATE: 64 BPM | WEIGHT: 127 LBS | HEIGHT: 64 IN | OXYGEN SATURATION: 100 %

## 2023-03-16 DIAGNOSIS — K27.9 PUD (PEPTIC ULCER DISEASE): ICD-10-CM

## 2023-03-16 PROCEDURE — 88305 TISSUE EXAM BY PATHOLOGIST: CPT | Mod: 59 | Performed by: PATHOLOGY

## 2023-03-16 PROCEDURE — E9220 PRA ENDO ANESTHESIA: HCPCS | Mod: ,,, | Performed by: NURSE ANESTHETIST, CERTIFIED REGISTERED

## 2023-03-16 PROCEDURE — 88305 TISSUE EXAM BY PATHOLOGIST: ICD-10-PCS | Mod: 26,,, | Performed by: PATHOLOGY

## 2023-03-16 PROCEDURE — 63600175 PHARM REV CODE 636 W HCPCS: Performed by: NURSE ANESTHETIST, CERTIFIED REGISTERED

## 2023-03-16 PROCEDURE — 43239 EGD BIOPSY SINGLE/MULTIPLE: CPT | Mod: ,,, | Performed by: INTERNAL MEDICINE

## 2023-03-16 PROCEDURE — 43239 EGD BIOPSY SINGLE/MULTIPLE: CPT | Performed by: INTERNAL MEDICINE

## 2023-03-16 PROCEDURE — 27201012 HC FORCEPS, HOT/COLD, DISP: Performed by: INTERNAL MEDICINE

## 2023-03-16 PROCEDURE — 88305 TISSUE EXAM BY PATHOLOGIST: CPT | Mod: 26,,, | Performed by: PATHOLOGY

## 2023-03-16 PROCEDURE — 25000003 PHARM REV CODE 250: Performed by: NURSE ANESTHETIST, CERTIFIED REGISTERED

## 2023-03-16 PROCEDURE — 37000009 HC ANESTHESIA EA ADD 15 MINS: Performed by: INTERNAL MEDICINE

## 2023-03-16 PROCEDURE — 37000008 HC ANESTHESIA 1ST 15 MINUTES: Performed by: INTERNAL MEDICINE

## 2023-03-16 PROCEDURE — E9220 PRA ENDO ANESTHESIA: ICD-10-PCS | Mod: ,,, | Performed by: NURSE ANESTHETIST, CERTIFIED REGISTERED

## 2023-03-16 PROCEDURE — 43239 PR EGD, FLEX, W/BIOPSY, SGL/MULTI: ICD-10-PCS | Mod: ,,, | Performed by: INTERNAL MEDICINE

## 2023-03-16 RX ORDER — PROPOFOL 10 MG/ML
VIAL (ML) INTRAVENOUS CONTINUOUS PRN
Status: DISCONTINUED | OUTPATIENT
Start: 2023-03-16 | End: 2023-03-16

## 2023-03-16 RX ORDER — SODIUM CHLORIDE 9 MG/ML
INJECTION, SOLUTION INTRAVENOUS CONTINUOUS
Status: DISCONTINUED | OUTPATIENT
Start: 2023-03-16 | End: 2023-03-16 | Stop reason: HOSPADM

## 2023-03-16 RX ORDER — PROPOFOL 10 MG/ML
VIAL (ML) INTRAVENOUS
Status: DISCONTINUED | OUTPATIENT
Start: 2023-03-16 | End: 2023-03-16

## 2023-03-16 RX ORDER — LIDOCAINE HYDROCHLORIDE 20 MG/ML
INJECTION INTRAVENOUS
Status: DISCONTINUED | OUTPATIENT
Start: 2023-03-16 | End: 2023-03-16

## 2023-03-16 RX ADMIN — GLYCOPYRROLATE 0.1 MG: 0.2 INJECTION, SOLUTION INTRAMUSCULAR; INTRAVENOUS at 12:03

## 2023-03-16 RX ADMIN — LIDOCAINE HYDROCHLORIDE 100 MG: 20 INJECTION INTRAVENOUS at 12:03

## 2023-03-16 RX ADMIN — PROPOFOL 100 MG: 10 INJECTION, EMULSION INTRAVENOUS at 12:03

## 2023-03-16 RX ADMIN — SODIUM CHLORIDE: 0.9 INJECTION, SOLUTION INTRAVENOUS at 12:03

## 2023-03-16 RX ADMIN — Medication 250 MCG/KG/MIN: at 12:03

## 2023-03-16 NOTE — ANESTHESIA POSTPROCEDURE EVALUATION
Anesthesia Post Evaluation    Patient: Misty Armendariz    Procedure(s) Performed: Procedure(s) (LRB):  EGD (ESOPHAGOGASTRODUODENOSCOPY) (N/A)    Final Anesthesia Type: general      Patient location during evaluation: PACU  Patient participation: Yes- Able to Participate  Level of consciousness: awake and alert and oriented  Post-procedure vital signs: reviewed and stable  Pain management: adequate  Airway patency: patent    PONV status at discharge: No PONV  Anesthetic complications: no      Cardiovascular status: stable  Respiratory status: unassisted, spontaneous ventilation and room air  Hydration status: euvolemic  Follow-up not needed.          Vitals Value Taken Time   BP 98/56 03/16/23 1310   Temp 36.4 °C (97.5 °F) 03/16/23 1310   Pulse 64 03/16/23 1310   Resp 16 03/16/23 1310   SpO2 98 % 03/16/23 1310         No case tracking events are documented in the log.      Pain/Carrie Score: Carrie Score: 8 (3/16/2023  1:16 PM)

## 2023-03-16 NOTE — PROVATION PATIENT INSTRUCTIONS
Discharge Summary/Instructions after an Endoscopic Procedure  Patient Name: Misty Armendariz  Patient MRN: [id number]  Patient YOB: 1960 Thursday, March 16, 2023  Solomon Whitaker MD  Dear patient,  As a result of recent federal legislation (The Federal Cures Act), you may   receive lab or pathology results from your procedure in your [facility]   account before your physician is able to contact you. Your physician or   their representative will relay the results to you with their   recommendations at their soonest availability.  Thank you,  RESTRICTIONS:  During your procedure today, you received medications for sedation.  These   medications may affect your judgment, balance and coordination.  Therefore,   for 24 hours, you have the following restrictions:   - DO NOT drive a car, operate machinery, make legal/financial decisions,   sign important papers or drink alcohol.    ACTIVITY:  Today: no heavy lifting, straining or running due to procedural   sedation/anesthesia.  The following day: return to full activity including work.  DIET:  Eat and drink normally unless instructed otherwise.     TREATMENT FOR COMMON SIDE EFFECTS:  - Mild abdominal pain, nausea, belching, bloating or excessive gas:  rest,   eat lightly and use a heating pad.  - Sore Throat: treat with throat lozenges and/or gargle with warm salt   water.  - Because air was used during the procedure, expelling large amounts of air   from your rectum or belching is normal.  - If a bowel prep was taken, you may not have a bowel movement for 1-3 days.    This is normal.  SYMPTOMS TO WATCH FOR AND REPORT TO YOUR PHYSICIAN:  1. Abdominal pain or bloating, other than gas cramps.  2. Chest pain.  3. Back pain.  4. Signs of infection such as: chills or fever occurring within 24 hours   after the procedure.  5. Rectal bleeding, which would show as bright red, maroon, or black stools.   (A tablespoon of blood from the rectum is not serious, especially if    hemorrhoids are present.)  6. Vomiting.  7. Weakness or dizziness.  GO DIRECTLY TO THE NEAREST EMERGENCY ROOM IF YOU HAVE ANY OF THE FOLLOWING:      Difficulty breathing              Chills and/or fever over 101 F   Persistent vomiting and/or vomiting blood   Severe abdominal pain   Severe chest pain   Black, tarry stools   Bleeding- more than one tablespoon   Any other symptom or condition that you feel may need urgent attention  Your doctor recommends these additional instructions:  If any biopsies were taken, your doctors clinic will contact you in 1 to 2   weeks with any results.  - Discharge patient to home.   - Follow an antireflux regimen.   - Use Protonix 40 mg once daily (or any other full strength proton pump   inhibitor) - best taken 45 minutes before your first protein containing   meal.   - Await pathology results.   - Telephone endoscopist for pathology results in 3 weeks.   - Repeat upper endoscopy in 5 years for surveillance based on pathology   results.   - Return to GI clinic.   - The findings and recommendations were discussed with the patient.  For questions, problems or results please call your physician - Solomon Whitaker MD at Work:  [phone].  [facility], EMERGENCY ROOM PHONE NUMBER: [phone]  IF A COMPLICATION OR EMERGENCY SITUATION ARISES AND YOU ARE UNABLE TO REACH   YOUR PHYSICIAN - GO DIRECTLY TO THE EMERGENCY ROOM.  Solomon Whitaker MD  3/16/2023 1:15:26 PM  This report has been verified and signed electronically.  Dear patient,  As a result of recent federal legislation (The Federal Cures Act), you may   receive lab or pathology results from your procedure in your [facility]   account before your physician is able to contact you. Your physician or   their representative will relay the results to you with their   recommendations at their soonest availability.  Thank you,  PROVATION

## 2023-03-16 NOTE — TRANSFER OF CARE
"Anesthesia Transfer of Care Note    Patient: Misty Armendariz    Procedure(s) Performed: Procedure(s) (LRB):  EGD (ESOPHAGOGASTRODUODENOSCOPY) (N/A)    Patient location: GI    Anesthesia Type: general    Transport from OR: Transported from OR on room air with adequate spontaneous ventilation    Post pain: adequate analgesia    Post assessment: no apparent anesthetic complications    Post vital signs: stable    Level of consciousness: awake    Nausea/Vomiting: no nausea/vomiting    Complications: none    Transfer of care protocol was followed      Last vitals:   Visit Vitals  BP 98/56    Pulse 65   Temp 36.7 °C (98 °F)   Resp 18   Ht 5' 4" (1.626 m)   Wt 57.6 kg (127 lb)   SpO2 100%   Breastfeeding No   BMI 21.80 kg/m²     "

## 2023-03-16 NOTE — ANESTHESIA PREPROCEDURE EVALUATION
2023  Misty Armendariz is a 62 y.o., female.  Past Medical History:   Diagnosis Date    Cancer     Cataract     surgery left eye, blepperplexy bilat eye    Digestive disorder     Skin cancer      Past Surgical History:   Procedure Laterality Date     SECTION      COLONOSCOPY      COLONOSCOPY N/A 2022    Procedure: COLONOSCOPY;  Surgeon: Solomon Whitaker MD;  Location: [facility] ([address] [place] [address]);  Service: Endoscopy;  Laterality: N/A;  Please schedule EGD and colonoscopy in about 8 weeks from today this will give enough time for her diverticulitis to heal she was diagnosed with diverticulitis 2022    ESOPHAGOGASTRODUODENOSCOPY      ESOPHAGOGASTRODUODENOSCOPY N/A 2022    Procedure: EGD (ESOPHAGOGASTRODUODENOSCOPY);  Surgeon: Solomon Whitaker MD;  Location: [address]);  Service: Endoscopy;  Laterality: N/A;  whitaker abdoul, [facility] email to [email or web address], pt has sutab prep-LW  pre call complete [facility]    EXCISION OF MELANOMA      WISDOM TOOTH EXTRACTION           Pre-op Assessment    I have reviewed the Patient Summary Reports.     I have reviewed the Nursing Notes. I have reviewed the NPO Status.   I have reviewed the Medications.     Review of Systems  Anesthesia Hx:  No problems with previous Anesthesia  Denies Family Hx of Anesthesia complications.   Denies Personal Hx of Anesthesia complications.   Social:  Alcohol Use    Hematology/Oncology:  Hematology Normal   Oncology Normal     EENT/Dental:EENT/Dental Normal   Cardiovascular:  Cardiovascular Normal     Pulmonary:  Pulmonary Normal    Hepatic/GI:   Bowel Prep. PUD, GERD    Musculoskeletal:  Musculoskeletal Normal    Neurological:  Neurology Normal    Endocrine:  Endocrine Normal    Dermatological:  Skin Normal    Psych:  Psychiatric Normal           Physical Exam  General: Well  nourished    Airway:  Mallampati: II   Mouth Opening: Normal  TM Distance: Normal  Tongue: Normal  Neck ROM: Normal ROM    Dental:  Intact        Anesthesia Plan  Type of Anesthesia, risks & benefits discussed:    Anesthesia Type: Gen Natural Airway  Intra-op Monitoring Plan: Standard ASA Monitors  Informed Consent: Informed consent signed with the Patient and all parties understand the risks and agree with anesthesia plan.  All questions answered.   ASA Score: 2  Day of Surgery Review of History & Physical: H&P Update referred to the surgeon/provider.I have interviewed and examined the patient. I have reviewed the patient's H&P dated: There are no significant changes.     Ready For Surgery From Anesthesia Perspective.     .

## 2023-03-16 NOTE — H&P
[facility]  History & Physical    Subjective:      Chief Complaint/Reason for Admission:     EGD for follow up pud    Misty Armendariz is a 62 y.o. female.    Past Medical History:   Diagnosis Date    Cancer     Cataract     surgery left eye, blepperplexy bilat eye    Digestive disorder     Skin cancer      Past Surgical History:   Procedure Laterality Date     SECTION      COLONOSCOPY      COLONOSCOPY N/A 2022    Procedure: COLONOSCOPY;  Surgeon: Solomon Whitaker MD;  Location: [facility] ([address] [place] [address]);  Service: Endoscopy;  Laterality: N/A;  Please schedule EGD and colonoscopy in about 8 weeks from today this will give enough time for her diverticulitis to heal she was diagnosed with diverticulitis 2022    ESOPHAGOGASTRODUODENOSCOPY      ESOPHAGOGASTRODUODENOSCOPY N/A 2022    Procedure: EGD (ESOPHAGOGASTRODUODENOSCOPY);  Surgeon: Solomon Whitaker MD;  Location: [address]);  Service: Endoscopy;  Laterality: N/A;  lizett schreiber, inst email to [email or web address], pt has sutab prep-LW  pre call complete [facility]    EXCISION OF MELANOMA      WISDOM TOOTH EXTRACTION       Family History   Problem Relation Age of Onset    Pancreatic cancer Maternal Grandfather 65        Possibly pancreatic cancer    Celiac disease Neg Hx     Cirrhosis Neg Hx     Colon cancer Neg Hx     Colon polyps Neg Hx     Crohn's disease Neg Hx     Cystic fibrosis Neg Hx     Esophageal cancer Neg Hx     Hemochromatosis Neg Hx     Inflammatory bowel disease Neg Hx     Irritable bowel syndrome Neg Hx     Liver cancer Neg Hx     Liver disease Neg Hx     Rectal cancer Neg Hx     Stomach cancer Neg Hx     Ulcerative colitis Neg Hx     Galicia's esophagus Neg Hx     Pancreatitis Neg Hx     Kidney cancer Neg Hx     Bladder Cancer Neg Hx     Uterine cancer Neg Hx     Ovarian cancer Neg Hx      Social History     Tobacco Use    Smoking status: Never   Substance Use Topics    Alcohol use: Yes     Alcohol/week:  7.0 standard drinks     Types: 4 Glasses of wine, 3 Shots of liquor per week     Comment: weekly     Drug use: Never       PTA Medications   Medication Sig    pantoprazole (PROTONIX) 40 MG tablet Take 1 tablet (40 mg total) by mouth before breakfast.    rosuvastatin (CRESTOR) 10 MG tablet Take 10 mg by mouth.    sod sulf-pot chloride-mag sulf (SUTAB) 1.479-0.188- 0.225 gram tablet Take 12 tablets by mouth once daily. BIN:[id number]:[id number]     Review of patient's allergies indicates:   Allergen Reactions    Erythromycin     Penicillins         Review of Systems   Constitutional:  Negative for fever.   Respiratory:  Negative for shortness of breath.    Cardiovascular:  Negative for chest pain.   Gastrointestinal:  Negative for abdominal pain, blood in stool and melena.     Objective:      Vital Signs (Most Recent)  Temp: 98 °F (36.7 °C) (03/16/23 1155)  Pulse: 65 (03/16/23 1155)  Resp: 18 (03/16/23 1155)  BP: (!) 146/71 (03/16/23 1155)  SpO2: 100 % (03/16/23 1155)    Vital Signs Range (Last 24H):  Temp:  [98 °F (36.7 °C)]   Pulse:  [65]   Resp:  [18]   BP: (146)/(71)   SpO2:  [100 %]     Physical Exam  Constitutional:       Appearance: Normal appearance.   Cardiovascular:      Rate and Rhythm: Normal rate.   Pulmonary:      Effort: Pulmonary effort is normal.   Abdominal:      Palpations: Abdomen is soft.   Neurological:      Mental Status: She is alert and oriented to person, place, and time.   Psychiatric:         Mood and Affect: Mood normal.         Behavior: Behavior normal.         Thought Content: Thought content normal.         Judgment: Judgment normal.         Assessment:      EGD for follow up pud      Plan:    EGD for follow up pud

## 2023-03-22 LAB
FINAL PATHOLOGIC DIAGNOSIS: NORMAL
GROSS: NORMAL
Lab: NORMAL

## 2023-04-06 NOTE — PROGRESS NOTES
Misty your EGD pathology was benign  No H pylori no Barretts esophagus recommend your next surveillance EGD in 5 years.     1. Stomach, biopsy:   Gastric antral and oxyntic mucosa with no diagnostic abnormality.   Negative for Helicobacter pylori.   2. Esophagus, distal, biopsy:   Gastric cardia-type mucosa with minimal chronic inflammation.   Negative for intestinal metaplasia and dysplasia.   Esophageal squamous mucosa with minimal reactive changes.   Comment: Interp By Prosper Patel M.D., Signed on 03/22/2023

## 2024-07-12 ENCOUNTER — PATIENT MESSAGE (OUTPATIENT)
Dept: GASTROENTEROLOGY | Facility: CLINIC | Age: 64
End: 2024-07-12
Payer: COMMERCIAL

## 2024-11-18 ENCOUNTER — PATIENT MESSAGE (OUTPATIENT)
Dept: GASTROENTEROLOGY | Facility: CLINIC | Age: 64
End: 2024-11-18
Payer: COMMERCIAL

## 2024-11-18 DIAGNOSIS — K27.9 PUD (PEPTIC ULCER DISEASE): ICD-10-CM

## 2024-11-18 DIAGNOSIS — K21.9 GASTROESOPHAGEAL REFLUX DISEASE, UNSPECIFIED WHETHER ESOPHAGITIS PRESENT: ICD-10-CM

## 2024-11-19 RX ORDER — PANTOPRAZOLE SODIUM 40 MG/1
40 TABLET, DELAYED RELEASE ORAL
Qty: 90 TABLET | Refills: 3 | Status: SHIPPED | OUTPATIENT
Start: 2024-11-19